# Patient Record
Sex: MALE | Race: WHITE | NOT HISPANIC OR LATINO | Employment: OTHER | URBAN - METROPOLITAN AREA
[De-identification: names, ages, dates, MRNs, and addresses within clinical notes are randomized per-mention and may not be internally consistent; named-entity substitution may affect disease eponyms.]

---

## 2018-12-24 DIAGNOSIS — R06.02 SOB (SHORTNESS OF BREATH): ICD-10-CM

## 2018-12-24 DIAGNOSIS — J43.2 CENTRILOBULAR EMPHYSEMA: Primary | ICD-10-CM

## 2018-12-24 PROBLEM — G47.9 SLEEP DISORDER: Status: ACTIVE | Noted: 2017-02-10

## 2018-12-24 PROBLEM — I10 ESSENTIAL HYPERTENSION: Status: ACTIVE | Noted: 2017-02-10

## 2018-12-26 ENCOUNTER — TELEPHONE (OUTPATIENT)
Dept: PULMONOLOGY | Facility: CLINIC | Age: 68
End: 2018-12-26

## 2018-12-26 RX ORDER — FLUTICASONE PROPIONATE AND SALMETEROL 250; 50 UG/1; UG/1
1 POWDER RESPIRATORY (INHALATION) 2 TIMES DAILY
COMMUNITY
Start: 2017-02-09 | End: 2019-01-09

## 2018-12-26 RX ORDER — TRIAMCINOLONE ACETONIDE 1 MG/G
CREAM TOPICAL
COMMUNITY
Start: 2018-11-21 | End: 2019-11-21

## 2018-12-26 RX ORDER — CLONAZEPAM 1 MG/1
1 TABLET ORAL NIGHTLY
COMMUNITY
Start: 2018-03-13

## 2018-12-26 RX ORDER — TIOTROPIUM BROMIDE 18 UG/1
18 CAPSULE ORAL; RESPIRATORY (INHALATION) DAILY
COMMUNITY
Start: 2017-02-09 | End: 2019-01-09 | Stop reason: ALTCHOICE

## 2018-12-26 RX ORDER — ALBUTEROL SULFATE 0.63 MG/3ML
1 SOLUTION RESPIRATORY (INHALATION) EVERY 6 HOURS PRN
COMMUNITY
End: 2019-01-09

## 2018-12-26 RX ORDER — LISINOPRIL AND HYDROCHLOROTHIAZIDE 10; 12.5 MG/1; MG/1
1 TABLET ORAL DAILY
COMMUNITY
Start: 2018-11-21

## 2018-12-26 RX ORDER — VENLAFAXINE HYDROCHLORIDE 150 MG/1
1 CAPSULE, EXTENDED RELEASE ORAL DAILY
COMMUNITY
Start: 2018-10-03

## 2018-12-26 RX ORDER — NAPROXEN 500 MG/1
1 TABLET ORAL 2 TIMES DAILY PRN
COMMUNITY
Start: 2018-12-18

## 2018-12-26 RX ORDER — DICLOFENAC SODIUM 10 MG/G
GEL TOPICAL 4 TIMES DAILY PRN
COMMUNITY
Start: 2018-04-13

## 2018-12-26 RX ORDER — ASPIRIN 81 MG/1
81 TABLET ORAL DAILY
COMMUNITY
Start: 2017-05-10

## 2018-12-26 RX ORDER — TRAZODONE HYDROCHLORIDE 150 MG/1
150 TABLET ORAL NIGHTLY PRN
COMMUNITY
Start: 2018-11-21

## 2018-12-26 RX ORDER — TEMAZEPAM 30 MG/1
30 CAPSULE ORAL NIGHTLY PRN
COMMUNITY
Start: 2018-11-21

## 2018-12-26 RX ORDER — PREDNISONE 5 MG/1
5 TABLET ORAL DAILY
COMMUNITY
End: 2019-03-03

## 2018-12-26 RX ORDER — HYDROCODONE BITARTRATE AND ACETAMINOPHEN 10; 325 MG/1; MG/1
1 TABLET ORAL EVERY 8 HOURS PRN
COMMUNITY
Start: 2018-11-21

## 2018-12-26 RX ORDER — ALBUTEROL SULFATE 90 UG/1
1-2 AEROSOL, METERED RESPIRATORY (INHALATION) EVERY 4 HOURS PRN
COMMUNITY
End: 2019-01-09 | Stop reason: SDUPTHER

## 2018-12-26 NOTE — TELEPHONE ENCOUNTER
Pt accepted [facility] appointment on 1/09/2018 at 1520 with Dr Vargas.  Pt provided times for all testing appointments.  Pt verbalized understanding.

## 2019-01-09 ENCOUNTER — CLINICAL SUPPORT (OUTPATIENT)
Dept: PULMONOLOGY | Facility: CLINIC | Age: 69
End: 2019-01-09
Payer: MEDICARE

## 2019-01-09 ENCOUNTER — OFFICE VISIT (OUTPATIENT)
Dept: PULMONOLOGY | Facility: CLINIC | Age: 69
End: 2019-01-09
Payer: MEDICARE

## 2019-01-09 ENCOUNTER — HOSPITAL ENCOUNTER (OUTPATIENT)
Dept: RADIOLOGY | Facility: HOSPITAL | Age: 69
Discharge: HOME | End: 2019-01-09
Attending: INTERNAL MEDICINE
Payer: MEDICARE

## 2019-01-09 VITALS
OXYGEN SATURATION: 96 % | RESPIRATION RATE: 18 BRPM | HEART RATE: 70 BPM | DIASTOLIC BLOOD PRESSURE: 60 MMHG | HEIGHT: 69 IN | BODY MASS INDEX: 27.99 KG/M2 | WEIGHT: 189 LBS | SYSTOLIC BLOOD PRESSURE: 124 MMHG

## 2019-01-09 DIAGNOSIS — J44.1 COPD EXACERBATION: ICD-10-CM

## 2019-01-09 DIAGNOSIS — J96.12 CHRONIC RESPIRATORY FAILURE WITH HYPOXIA AND HYPERCAPNIA: Primary | ICD-10-CM

## 2019-01-09 DIAGNOSIS — R06.02 SOB (SHORTNESS OF BREATH): ICD-10-CM

## 2019-01-09 DIAGNOSIS — R09.02 EXERCISE HYPOXEMIA: ICD-10-CM

## 2019-01-09 DIAGNOSIS — J43.2 CENTRILOBULAR EMPHYSEMA: ICD-10-CM

## 2019-01-09 DIAGNOSIS — J44.9 CHRONIC OBSTRUCTIVE PULMONARY DISEASE, UNSPECIFIED COPD TYPE: ICD-10-CM

## 2019-01-09 DIAGNOSIS — J96.11 CHRONIC RESPIRATORY FAILURE WITH HYPOXIA AND HYPERCAPNIA: Primary | ICD-10-CM

## 2019-01-09 DIAGNOSIS — J96.11 CHRONIC RESPIRATORY FAILURE WITH HYPOXIA: ICD-10-CM

## 2019-01-09 DIAGNOSIS — B35.1 NAIL FUNGUS: ICD-10-CM

## 2019-01-09 PROCEDURE — 71046 X-RAY EXAM CHEST 2 VIEWS: CPT | Mod: TC

## 2019-01-09 PROCEDURE — 99214 OFFICE O/P EST MOD 30 MIN: CPT | Mod: PBBFAC,25 | Performed by: INTERNAL MEDICINE

## 2019-01-09 PROCEDURE — 94060 EVALUATION OF WHEEZING: CPT | Mod: PBBFAC

## 2019-01-09 PROCEDURE — 71046 XR CHEST PA AND LATERAL: ICD-10-PCS | Mod: 26,,, | Performed by: RADIOLOGY

## 2019-01-09 PROCEDURE — 99999 PR PBB SHADOW E&M-EST. PATIENT-LVL IV: ICD-10-PCS | Mod: PBBFAC,,, | Performed by: INTERNAL MEDICINE

## 2019-01-09 PROCEDURE — 99999 PR PBB SHADOW E&M-EST. PATIENT-LVL IV: CPT | Mod: PBBFAC,,, | Performed by: INTERNAL MEDICINE

## 2019-01-09 PROCEDURE — 99205 OFFICE O/P NEW HI 60 MIN: CPT | Mod: 25,S$PBB,, | Performed by: INTERNAL MEDICINE

## 2019-01-09 PROCEDURE — 99205 PR OFFICE/OUTPT VISIT, NEW, LEVL V, 60-74 MIN: ICD-10-PCS | Mod: 25,S$PBB,, | Performed by: INTERNAL MEDICINE

## 2019-01-09 PROCEDURE — 94060 PR EVAL OF BRONCHOSPASM: ICD-10-PCS | Mod: 26,S$PBB,, | Performed by: INTERNAL MEDICINE

## 2019-01-09 PROCEDURE — 94060 EVALUATION OF WHEEZING: CPT | Mod: 26,S$PBB,, | Performed by: INTERNAL MEDICINE

## 2019-01-09 PROCEDURE — 71046 X-RAY EXAM CHEST 2 VIEWS: CPT | Mod: 26,,, | Performed by: RADIOLOGY

## 2019-01-09 RX ORDER — AZITHROMYCIN 250 MG/1
TABLET, FILM COATED ORAL
Qty: 6 TABLET | Refills: 1 | Status: SHIPPED | OUTPATIENT
Start: 2019-01-09

## 2019-01-09 RX ORDER — METHYLPREDNISOLONE 4 MG/1
TABLET ORAL
Qty: 1 PACKAGE | Refills: 1 | Status: SHIPPED | OUTPATIENT
Start: 2019-01-09 | End: 2019-03-03

## 2019-01-09 RX ORDER — FLUTICASONE PROPIONATE AND SALMETEROL XINAFOATE 115; 21 UG/1; UG/1
2 AEROSOL, METERED RESPIRATORY (INHALATION) 2 TIMES DAILY
Qty: 1 INHALER | Refills: 11 | Status: SHIPPED | OUTPATIENT
Start: 2019-01-09 | End: 2019-11-15 | Stop reason: ALTCHOICE

## 2019-01-09 RX ORDER — ALBUTEROL SULFATE 0.83 MG/ML
2.5 SOLUTION RESPIRATORY (INHALATION)
Qty: 360 ML | Refills: 11 | Status: SHIPPED | OUTPATIENT
Start: 2019-01-09 | End: 2020-01-09

## 2019-01-09 RX ORDER — VERAPAMIL HYDROCHLORIDE 120 MG/1
TABLET, FILM COATED, EXTENDED RELEASE ORAL
Refills: 1 | COMMUNITY
Start: 2019-01-07

## 2019-01-09 RX ORDER — ALBUTEROL SULFATE 90 UG/1
1-2 AEROSOL, METERED RESPIRATORY (INHALATION) EVERY 4 HOURS PRN
Qty: 1 INHALER | Refills: 11 | Status: SHIPPED | OUTPATIENT
Start: 2019-01-09 | End: 2019-11-15 | Stop reason: SDUPTHER

## 2019-01-09 NOTE — PROGRESS NOTES
Subjective:       Patient ID: Uzair Mosley is a 68 y.o. male.    Chief Complaint: Centrilobular Emphysema    HPI COPD  He presents for evaluation and treatment of COPD. The patient is not currently having symptoms / an exacerbation. Current symptoms include chronic dyspnea, cough productive of clear sputum in small amounts and wheezing. Symptoms have been present since several weeks ago and have been unchanged. He denies chills and fever. Associated symptoms include poor exercise tolerance and shortness of breath.  This episode appears to have been triggered by cold air and exercise. Treatments tried for the current exacerbation: albuterol inhaler and oxygen. The patient has been having similar episodes for approximately 8 years. He uses 1 pillows at night. Patient currently is on oxygen at 2 L/min per nasal cannula.. The patient is having no constitutional symptoms, denying fever, chills, anorexia, or weight loss. The patient has been hospitalized for this condition before. He quit smoking approximately many year ago. The patient is experiencing exercise intolerance (difficulty walking 1 blocks on flat ground).      Failed use of adviar disk - dry powder is irritating  prescription for adviar MDI    Chronic hypoxic respiratory  Failure:  The patient has been prescribed oxygen for chronic respiratory condition . He has severe lung disease and hypoxia related symptoms. Other treatments including inhaled bronchodilators, nebulizer treatments have been tried. Patient is mobile within the home and would benefit from prescribed oxygen.Patient has documented nocturnal hypoxemia and signs and symptoms of nocturnal hypoxia which include but are not limited to fatigue, daytime hypersomnolence , non restorative sleep, edema.    Is not happy with [facility] - want to have a new supplier of [employer]QUIPMENT  New prescription for oxygen written    Patient has chronic respiratory failure due to  Chronic Obstructive Pulmonary  Disease. Patient will need a targeted tidal volume which cannot be provided via Continuous Positive Airway Pressure. BiPAP will not lower the patient's CO2 levels . This patient will require a set tidal volume. Non-Invasive ventilation is being ordered due the the severe nature of this patient 's disease. Without this therapy the patient is at risk for terminal decline due to progressive respiratory failure.      Results for DIEGO TREADWELL (MRN [id number]) as of 3/29/2019 16:55   Ref. Range 3/3/2019 03:33   POC PH Latest Ref Range: 7.35 - 7.45  7.322 (L)   POC PCO2 Latest Ref Range: 35 - 45 mmHg 58.0 (HH)   POC PO2 Latest Ref Range: 80 - 100 mmHg 65 (L)   POC BE Latest Ref Range: -2 to 2 mmol/L 4   POC HCO3 Latest Ref Range: 24 - 28 mmol/L 30.0 (H)   POC SATURATED O2 Latest Ref Range: 95 - 100 % 90 (L)   Flow Unknown 3   Sample Unknown ARTERIAL   DelSys Unknown Nasal Can   Allens Test Unknown Pass   Site Unknown LR   Mode Unknown SPONT           Past Medical History:   Diagnosis Date    Adjustment disorder with depressed mood 05/25/2018    COPD (chronic obstructive pulmonary disease) 10/08/2012    Disorientation 11/26/2012    Essential hypertension 02/10/2017    Hypoxemia requiring supplemental oxygen 11/26/2012    Obstructive chronic bronchitis with exacerbation 05/21/2013    Pleural effusion 10/08/2012    Sedative dependence 02/10/2017    Sleep disorder 02/10/2017    SOB (shortness of breath) 10/30/2012    Uncomplicated opioid dependence 02/10/2017     History reviewed. No pertinent surgical history.  Social History     Socioeconomic History    Marital status:      Spouse name: Not on file    Number of children: Not on file    Years of education: Not on file    Highest education level: Not on file   Occupational History    Not on file   Social Needs    Financial resource strain: Not on file    Food insecurity:     Worry: Not on file     Inability: Not on file    Transportation needs:      Medical: Not on file     Non-medical: Not on file   Tobacco Use    Smoking status: Former Smoker     Packs/day: 2.00     Years: 24.00     Pack years: 48.00     Start date: 1966     Last attempt to quit: 1992     Years since quittin.2    Smokeless tobacco: Never Used   Substance and Sexual Activity    Alcohol use: No     Frequency: Never    Drug use: Not on file    Sexual activity: Not on file   Lifestyle    Physical activity:     Days per week: Not on file     Minutes per session: Not on file    Stress: Not on file   Relationships    Social connections:     Talks on phone: Not on file     Gets together: Not on file     Attends Anabaptism service: Not on file     Active member of club or organization: Not on file     Attends meetings of clubs or organizations: Not on file     Relationship status: Not on file    Intimate partner violence:     Fear of current or ex partner: Not on file     Emotionally abused: Not on file     Physically abused: Not on file     Forced sexual activity: Not on file   Other Topics Concern    Not on file   Social History Narrative    Not on file     Review of Systems   Constitutional: Positive for fatigue. Negative for fever.   HENT: Positive for postnasal drip, rhinorrhea and congestion.    Eyes: Negative for redness and itching.   Respiratory: Positive for cough, sputum production, shortness of breath, dyspnea on extertion, use of rescue inhaler and Paroxysmal Nocturnal Dyspnea.    Cardiovascular: Negative for chest pain, palpitations and leg swelling.   Genitourinary: Negative for difficulty urinating and hematuria.   Endocrine: Negative for cold intolerance and heat intolerance.    Skin: Negative for rash.   Gastrointestinal: Negative for nausea and abdominal pain.   Neurological: Negative for dizziness, syncope, weakness and light-headedness.   Hematological: Negative for adenopathy. Does not bruise/bleed easily.   Psychiatric/Behavioral: Negative for sleep  disturbance. The patient is not nervous/anxious.        Objective:      Physical Exam   Constitutional: He is oriented to person, place, and time. He appears well-developed and well-nourished.   HENT:   Head: Normocephalic and atraumatic.   Mouth/Throat: Oropharyngeal exudate present.   Eyes: Conjunctivae are normal. Pupils are equal, round, and reactive to light.   Neck: Neck supple. No JVD present. No tracheal deviation present. No thyromegaly present.   Cardiovascular: Normal rate, regular rhythm and normal heart sounds.   No murmur heard.  Pulmonary/Chest: Effort normal. He has decreased breath sounds. He has wheezes in the right lower field and the left lower field. He has no rhonchi. He has no rales.   Abdominal: Soft. Bowel sounds are normal.   Musculoskeletal: Normal range of motion. He exhibits no edema or tenderness.   Lymphadenopathy:     He has no cervical adenopathy.   Neurological: He is alert and oriented to person, place, and time.   Skin: Skin is warm and dry.   Nursing note and vitals reviewed.    Personal Diagnostic Review  Chest X-Ray: I personally reviewed the films and findings are:, air trapping/emphysema  Pulmonary function tests:  Severe obstruction  Conditions of testing: Stable outpatient  Results:  88% with exercise  92 % on room air  Hypoxemia with exercise.  Corrected with supplemental oxygen.  Diagnosis: Chronic respiratory failure      Moy Vargas MD  Pulmonary / Critical Care Medicine    Pulmonary Studies Review 3/3/2019 3/3/2019 3/3/2019 3/3/2019 3/3/2019 3/3/2019 1/9/2019   SpO2 95 93 93 90 96 92 96   Height - - - - - 69 69.000   Weight - - - - - 3024 3024   BMI (Calculated) - - - - - 28 28   Predicted Distance - - - - - 344.12 344.12   Predicted Distance Meters (Calculated) - - - - - 515.74 515.74     No flowsheet data found.      Assessment:       1. Chronic respiratory failure with hypoxia and hypercapnia    2. Chronic obstructive pulmonary disease, unspecified COPD type     3. Chronic respiratory failure with hypoxia    4. COPD exacerbation    5. Exercise hypoxemia    6. Nail fungus                        Outpatient Encounter Medications as of 2019   Medication Sig Dispense Refill    aspirin (ECOTRIN) 81 MG EC tablet Take 81 mg by mouth Daily.      diclofenac sodium (VOLTAREN) 1 % Gel Apply topically 4 (four) times daily as needed.      naproxen (NAPROSYN) 500 MG tablet Take 1 tablet by mouth 2 (two) times daily as needed.      temazepam (RESTORIL) 30 mg capsule Take 30 mg by mouth nightly as needed.      traZODone (DESYREL) 150 MG tablet Take 150 mg by mouth nightly as needed.      triamcinolone acetonide 0.1% (KENALOG) 0.1 % cream Apply to affected area 2 times daily      venlafaxine (EFFEXOR-XR) 150 MG Cp24 Take 1 capsule by mouth Daily.      [DISCONTINUED] predniSONE (DELTASONE) 5 MG tablet Take 5 mg by mouth Daily.      [DISCONTINUED] tiotropium (SPIRIVA) 18 mcg inhalation capsule Inhale 18 mcg into the lungs Daily.      albuterol (PROVENTIL) 2.5 mg /3 mL (0.083 %) nebulizer solution Take 3 mLs (2.5 mg total) by nebulization every 6 (six) hours while awake. 360 mL 11    albuterol (PROVENTIL/VENTOLIN HFA) 90 mcg/actuation inhaler Inhale 1-2 puffs into the lungs every 4 (four) hours as needed. 1 Inhaler 11    azithromycin (ZITHROMAX Z-AMALIA) 250 MG tablet 500 mg on day 1 (two tablets) followed by 250 mg once daily on days 2-5 6 tablet 1    clonazePAM (KLONOPIN) 1 MG tablet Take 1 mg by mouth every evening.      fluticasone-salmeterol (ADVAIR HFA) 115-21 mcg/actuation HFAA Inhale 2 puffs into the lungs 2 (two) times daily. Controller 1 Inhaler 11    HYDROcodone-acetaminophen (NORCO)  mg per tablet Take 1 tablet by mouth every 8 (eight) hours as needed.      lisinopril-hydrochlorothiazide (PRINZIDE,ZESTORETIC) 10-12.5 mg per tablet Take 1 tablet by mouth Daily.      [] umeclidinium (INCRUSE ELLIPTA) 62.5 mcg/actuation DsDv Inhale 62.5 mcg into the  "lungs once daily. 30 each 11    verapamil (CALAN-SR) 120 MG CR tablet TK 1 T PO Q NIGHT  1    [DISCONTINUED] albuterol (ACCUNEB) 0.63 mg/3 mL Nebu Inhale 1 ampule into the lungs every 6 (six) hours as needed.      [DISCONTINUED] albuterol (PROVENTIL/VENTOLIN HFA) 90 mcg/actuation inhaler Inhale 1-2 puffs into the lungs every 4 (four) hours as needed.      [DISCONTINUED] fluticasone-salmeterol 250-50 mcg/dose (ADVAIR) 250-50 mcg/dose diskus inhaler Inhale 1 puff into the lungs 2 (two) times daily.      [DISCONTINUED] methylPREDNISolone (MEDROL DOSEPACK) 4 mg tablet use as directed 1 Package 1     No facility-administered encounter medications on file as of 1/9/2019.      Orders Placed This Encounter   Procedures    HME - OTHER     Referred to [facility] company:  [id number]  [place] [id number]  [phone]  Fax [phone]     Order Specific Question:   Type of Equipment:     Answer:   discontinue oxygen     Order Specific Question:   Height:     Answer:   5' 9" (1.753 m)     Order Specific Question:   Weight:     Answer:   85.7 kg (189 lb)     Order Specific Question:   Does patient have medical equipment at home?     Answer:   portable oxygen    OXYGEN FOR HOME USE     Patient preference: battery operated portable unit - SANDOR -with oxygen conserving device  [facility] for CPAP/Oxygen/Nebulizer supplies.  Customer Service: [phone]  Call: [phone]  Fax: [phone]  Billing Inquiries: [phone] or [phone]     Order Specific Question:   Liter Flow     Answer:   2     Order Specific Question:   Duration     Answer:   With activity     Order Specific Question:   Qualifying SpO2:     Answer:   88%     Order Specific Question:   Testing done at:     Answer:   Exercise/Activity     Order Specific Question:   Route     Answer:   nasal cannula     Order Specific Question:   Portable mode:     Answer:   pulse dose acceptable     Order Specific Question:   Device     Answer:  " " home concentrator with portable unit     Order Specific Question:   Length of need (in months):     Answer:   99 mos     Order Specific Question:   Patient condition with qualifying saturation     Answer:   COPD     Order Specific Question:   Height:     Answer:   5' 9" (1.753 m)     Order Specific Question:   Weight:     Answer:   85.7 kg (189 lb)     Order Specific Question:   Alternative treatment measures have been tried or considered and deemed clinically ineffective.     Answer:   Yes    VENTILATOR FOR HOME USE     Download ventilation reports with [employer] software download frequency - monthly  Hours of use 8  Continuous  during sleep  Pmax has to be the sum of EPAP min and PS max  Pmax = EPAP min + PS Max  Referred to DME company:  [employer].  [place] [id number]  [phone]  Fax [phone]     Order Specific Question:   Height:     Answer:   5' 9" (1.753 m)     Order Specific Question:   Weight:     Answer:   85.7 kg (189 lb)     Order Specific Question:   Length of need (1-99 months):     Answer:   99     Order Specific Question:   Interface needed:     Answer:   Nasal mask     Order Specific Question:   Mode:     Answer:   AVAPS-AE     Order Specific Question:   Rate:     Answer:   12     Comments:   auto     Order Specific Question:   Tidal Volume     Answer:   400     Comments:   8 mg/kg ideal body weight     Order Specific Question:   PEEP - EPAP     Answer:   4.0 CM/H2O     Order Specific Question:   Pressure support - IPAP     Answer:   14     Comments:   EPAP max 14, EPAP min 4     Order Specific Question:   FiO2%     Answer:   21     Order Specific Question:   Humidifier needed?     Answer:   Yes    Ambulatory Referral to Dermatology     Referral Priority:   Routine     Referral Type:   Consultation     Referral Reason:   Specialty Services Required     Requested Specialty:   Dermatology     Number of Visits Requested:   1    Stress test, pulmonary     Standing " Status:   Future     Standing Expiration Date:   2020     Plan:       Requested Prescriptions     Signed Prescriptions Disp Refills    albuterol (PROVENTIL) 2.5 mg /3 mL (0.083 %) nebulizer solution 360 mL 11     Sig: Take 3 mLs (2.5 mg total) by nebulization every 6 (six) hours while awake.    albuterol (PROVENTIL/VENTOLIN HFA) 90 mcg/actuation inhaler 1 Inhaler 11     Sig: Inhale 1-2 puffs into the lungs every 4 (four) hours as needed.    umeclidinium (INCRUSE ELLIPTA) 62.5 mcg/actuation DsDv 30 each 11     Sig: Inhale 62.5 mcg into the lungs once daily.    fluticasone-salmeterol (ADVAIR HFA) 115-21 mcg/actuation HFAA 1 Inhaler 11     Sig: Inhale 2 puffs into the lungs 2 (two) times daily. Controller    azithromycin (ZITHROMAX Z-AMALIA) 250 MG tablet 6 tablet 1     Si mg on day 1 (two tablets) followed by 250 mg once daily on days 2-5     Chronic respiratory failure with hypoxia and hypercapnia  -     VENTILATOR FOR HOME USE    Chronic obstructive pulmonary disease, unspecified COPD type  -     albuterol (PROVENTIL) 2.5 mg /3 mL (0.083 %) nebulizer solution; Take 3 mLs (2.5 mg total) by nebulization every 6 (six) hours while awake.  Dispense: 360 mL; Refill: 11  -     HME - OTHER  -     albuterol (PROVENTIL/VENTOLIN HFA) 90 mcg/actuation inhaler; Inhale 1-2 puffs into the lungs every 4 (four) hours as needed.  Dispense: 1 Inhaler; Refill: 11  -     umeclidinium (INCRUSE ELLIPTA) 62.5 mcg/actuation DsDv; Inhale 62.5 mcg into the lungs once daily.  Dispense: 30 each; Refill: 11  -     fluticasone-salmeterol (ADVAIR HFA) 115-21 mcg/actuation HFAA; Inhale 2 puffs into the lungs 2 (two) times daily. Controller  Dispense: 1 Inhaler; Refill: 11  -     OXYGEN FOR HOME USE  -     Stress test, pulmonary; Future    Chronic respiratory failure with hypoxia  -     HME - OTHER  -     OXYGEN FOR HOME USE    COPD exacerbation  -     Discontinue: methylPREDNISolone (MEDROL DOSEPACK) 4 mg tablet; use as directed   Dispense: 1 Package; Refill: 1  -     azithromycin (ZITHROMAX Z-AMALIA) 250 MG tablet; 500 mg on day 1 (two tablets) followed by 250 mg once daily on days 2-5  Dispense: 6 tablet; Refill: 1    Exercise hypoxemia  -     OXYGEN FOR HOME USE  -     Stress test, pulmonary; Future    Nail fungus  -     Ambulatory Referral to Dermatology      Follow-up in about 8 weeks (around 3/4/2019) for 6 min walk.    MEDICAL DECISION MAKING: Moderate to high complexity.  Overall, the multiple problems listed are of moderate to high severity that may impact quality of life and activities of daily living. Side effects of medications, treatment plan as well as options and alternatives reviewed and discussed with patient. There was counseling of patient concerning these issues.    Total time spent in face to face counseling and coordination of care - 60 minutes over 50% of time was used in discussion of prognosis, risks, benefits of treatment, instructions and compliance with regimen . Discussion with other physicians or health care providers occurred.

## 2019-01-10 LAB
BRPFT: ABNORMAL
FEF 25 75 CHG: 70.5 %
FEF 25 75 LLN: 1.02
FEF 25 75 POST REF: 17.8 %
FEF 25 75 PRE REF: 10.5 %
FEF 25 75 REF: 2.37
FET100 CHG: -0.8 %
FEV1 CHG: 42.2 %
FEV1 FVC CHG: -3.7 %
FEV1 FVC LLN: 63
FEV1 FVC POST REF: 48.4 %
FEV1 FVC PRE REF: 50.2 %
FEV1 FVC REF: 76
FEV1 LLN: 2.24
FEV1 POST REF: 31.8 %
FEV1 PRE REF: 22.3 %
FEV1 REF: 3.1
FEV6 CHG: 52.1 %
FEV6 LLN: 3.13
FEV6 POST REF: 59.8 %
FEV6 POST: 2.4 L (ref 3.13–4.89)
FEV6 PRE REF: 39.3 %
FEV6 PRE: 1.58 L (ref 3.13–4.89)
FEV6 REF: 4.01
FVC CHG: 47.6 %
FVC LLN: 3.03
FVC POST REF: 65.4 %
FVC PRE REF: 44.3 %
FVC REF: 4.09
MVV LLN: 103
MVV REF: 121
PEF CHG: 12.9 %
PEF LLN: 5.88
PEF POST REF: 39.8 %
PEF PRE REF: 35.3 %
PEF REF: 8.14
POST FEF 25 75: 0.42 L/S (ref 1.02–4.27)
POST FET 100: 8.86 SEC
POST FEV1 FVC: 36.82 % (ref 62.61–88.03)
POST FEV1: 0.98 L (ref 2.24–3.91)
POST FVC: 2.67 L (ref 3.03–5.16)
POST PEF: 3.24 L/S (ref 5.88–10.39)
PRE FEF 25 75: 0.25 L/S (ref 1.02–4.27)
PRE FET 100: 8.93 SEC
PRE FEV1 FVC: 38.23 % (ref 62.61–88.03)
PRE FEV1: 0.69 L (ref 2.24–3.91)
PRE FVC: 1.81 L (ref 3.03–5.16)
PRE PEF: 2.87 L/S (ref 5.88–10.39)

## 2019-01-23 ENCOUNTER — TELEPHONE (OUTPATIENT)
Dept: PULMONOLOGY | Facility: CLINIC | Age: 69
End: 2019-01-23

## 2019-01-23 NOTE — TELEPHONE ENCOUNTER
----- Message from Nadiya Robertson sent at 1/23/2019  9:37 AM CST -----  Contact: Marlon Roy- [phone]  Would like to consult with the nurse about Oxygen orders and home delivery.  Please call back at [phone].  x-

## 2019-01-23 NOTE — TELEPHONE ENCOUNTER
Pt's wife instructed to contact [facility] for assistance.     [facility] for CPAP/Oxygen/Nebulizer supplies.  Customer Service: [phone]  Call: [phone]  Fax: [phone]  Billing Inquiries: [phone] or [phone]

## 2019-02-22 ENCOUNTER — TELEPHONE (OUTPATIENT)
Dept: PULMONOLOGY | Facility: CLINIC | Age: 69
End: 2019-02-22

## 2019-03-03 ENCOUNTER — HOSPITAL ENCOUNTER (EMERGENCY)
Facility: HOSPITAL | Age: 69
Discharge: HOME | End: 2019-03-03
Attending: EMERGENCY MEDICINE
Payer: MEDICARE

## 2019-03-03 VITALS
BODY MASS INDEX: 27.99 KG/M2 | RESPIRATION RATE: 20 BRPM | TEMPERATURE: 98 F | DIASTOLIC BLOOD PRESSURE: 66 MMHG | HEIGHT: 69 IN | WEIGHT: 189 LBS | SYSTOLIC BLOOD PRESSURE: 109 MMHG | OXYGEN SATURATION: 95 % | HEART RATE: 98 BPM

## 2019-03-03 DIAGNOSIS — J20.9 ACUTE BRONCHITIS, UNSPECIFIED ORGANISM: ICD-10-CM

## 2019-03-03 DIAGNOSIS — J44.1 COPD EXACERBATION: Primary | ICD-10-CM

## 2019-03-03 DIAGNOSIS — R06.02 SOB (SHORTNESS OF BREATH): ICD-10-CM

## 2019-03-03 LAB
ALLENS TEST: ABNORMAL
ANION GAP SERPL CALC-SCNC: 12 MMOL/L
BASOPHILS # BLD AUTO: 0.04 K/UL
BASOPHILS NFR BLD: 0.5 %
BNP SERPL-MCNC: <10 PG/ML
BUN SERPL-MCNC: 17 MG/DL
CALCIUM SERPL-MCNC: 10.7 MG/DL
CHLORIDE SERPL-SCNC: 99 MMOL/L
CO2 SERPL-SCNC: 29 MMOL/L
CREAT SERPL-MCNC: 1.2 MG/DL
DELSYS: ABNORMAL
DIFFERENTIAL METHOD: NORMAL
EOSINOPHIL # BLD AUTO: 0.5 K/UL
EOSINOPHIL NFR BLD: 6.3 %
ERYTHROCYTE [DISTWIDTH] IN BLOOD BY AUTOMATED COUNT: 12.9 %
EST. GFR  (AFRICAN AMERICAN): >60 ML/MIN/1.73 M^2
EST. GFR  (NON AFRICAN AMERICAN): >60 ML/MIN/1.73 M^2
FLOW: 3
GLUCOSE SERPL-MCNC: 138 MG/DL
HCO3 UR-SCNC: 30 MMOL/L (ref 24–28)
HCT VFR BLD AUTO: 44.5 %
HGB BLD-MCNC: 14.4 G/DL
LYMPHOCYTES # BLD AUTO: 2.6 K/UL
LYMPHOCYTES NFR BLD: 31.2 %
MCH RBC QN AUTO: 30.5 PG
MCHC RBC AUTO-ENTMCNC: 32.4 G/DL
MCV RBC AUTO: 94 FL
MODE: ABNORMAL
MONOCYTES # BLD AUTO: 0.6 K/UL
MONOCYTES NFR BLD: 7.5 %
NEUTROPHILS # BLD AUTO: 4.5 K/UL
NEUTROPHILS NFR BLD: 54.5 %
PCO2 BLDA: 58 MMHG (ref 35–45)
PH SMN: 7.32 [PH] (ref 7.35–7.45)
PLATELET # BLD AUTO: 234 K/UL
PMV BLD AUTO: 9.5 FL
PO2 BLDA: 65 MMHG (ref 80–100)
POC BE: 4 MMOL/L
POC SATURATED O2: 90 % (ref 95–100)
POTASSIUM SERPL-SCNC: 4 MMOL/L
RBC # BLD AUTO: 4.72 M/UL
SAMPLE: ABNORMAL
SITE: ABNORMAL
SODIUM SERPL-SCNC: 140 MMOL/L
WBC # BLD AUTO: 8.28 K/UL

## 2019-03-03 PROCEDURE — 82375 ASSAY CARBOXYHB QUANT: CPT

## 2019-03-03 PROCEDURE — 63600175 PHARM REV CODE 636 W HCPCS: Performed by: EMERGENCY MEDICINE

## 2019-03-03 PROCEDURE — 93010 ELECTROCARDIOGRAM REPORT: CPT | Mod: ,,, | Performed by: INTERNAL MEDICINE

## 2019-03-03 PROCEDURE — 25000242 PHARM REV CODE 250 ALT 637 W/ HCPCS: Performed by: EMERGENCY MEDICINE

## 2019-03-03 PROCEDURE — 96365 THER/PROPH/DIAG IV INF INIT: CPT

## 2019-03-03 PROCEDURE — 82803 BLOOD GASES ANY COMBINATION: CPT

## 2019-03-03 PROCEDURE — 85025 COMPLETE CBC W/AUTO DIFF WBC: CPT

## 2019-03-03 PROCEDURE — 37799 UNLISTED PX VASCULAR SURGERY: CPT

## 2019-03-03 PROCEDURE — 25000003 PHARM REV CODE 250: Performed by: EMERGENCY MEDICINE

## 2019-03-03 PROCEDURE — 83880 ASSAY OF NATRIURETIC PEPTIDE: CPT

## 2019-03-03 PROCEDURE — 27000221 HC OXYGEN, UP TO 24 HOURS

## 2019-03-03 PROCEDURE — 99900035 HC TECH TIME PER 15 MIN (STAT)

## 2019-03-03 PROCEDURE — 82800 BLOOD PH: CPT

## 2019-03-03 PROCEDURE — 80048 BASIC METABOLIC PNL TOTAL CA: CPT

## 2019-03-03 PROCEDURE — 96375 TX/PRO/DX INJ NEW DRUG ADDON: CPT

## 2019-03-03 PROCEDURE — 36600 WITHDRAWAL OF ARTERIAL BLOOD: CPT

## 2019-03-03 PROCEDURE — 99284 EMERGENCY DEPT VISIT MOD MDM: CPT | Mod: 25

## 2019-03-03 PROCEDURE — 93010 EKG 12-LEAD: ICD-10-PCS | Mod: ,,, | Performed by: INTERNAL MEDICINE

## 2019-03-03 PROCEDURE — 94640 AIRWAY INHALATION TREATMENT: CPT

## 2019-03-03 PROCEDURE — 93005 ELECTROCARDIOGRAM TRACING: CPT

## 2019-03-03 PROCEDURE — 36415 COLL VENOUS BLD VENIPUNCTURE: CPT

## 2019-03-03 RX ORDER — PREDNISONE 50 MG/1
50 TABLET ORAL DAILY
Qty: 7 TABLET | Refills: 0 | Status: SHIPPED | OUTPATIENT
Start: 2019-03-03 | End: 2019-03-10

## 2019-03-03 RX ORDER — IPRATROPIUM BROMIDE AND ALBUTEROL SULFATE 2.5; .5 MG/3ML; MG/3ML
3 SOLUTION RESPIRATORY (INHALATION) ONCE
Status: COMPLETED | OUTPATIENT
Start: 2019-03-03 | End: 2019-03-03

## 2019-03-03 RX ORDER — AZITHROMYCIN 250 MG/1
250 TABLET, FILM COATED ORAL DAILY
Qty: 6 TABLET | Refills: 0 | Status: SHIPPED | OUTPATIENT
Start: 2019-03-03

## 2019-03-03 RX ORDER — ONDANSETRON 2 MG/ML
4 INJECTION INTRAMUSCULAR; INTRAVENOUS
Status: COMPLETED | OUTPATIENT
Start: 2019-03-03 | End: 2019-03-03

## 2019-03-03 RX ADMIN — ONDANSETRON 4 MG: 2 INJECTION INTRAMUSCULAR; INTRAVENOUS at 03:03

## 2019-03-03 RX ADMIN — IPRATROPIUM BROMIDE AND ALBUTEROL SULFATE 3 ML: .5; 3 SOLUTION RESPIRATORY (INHALATION) at 02:03

## 2019-03-03 RX ADMIN — AZITHROMYCIN MONOHYDRATE 500 MG: 500 INJECTION, POWDER, LYOPHILIZED, FOR SOLUTION INTRAVENOUS at 02:03

## 2019-03-03 RX ADMIN — SODIUM CHLORIDE 500 ML: 0.9 INJECTION, SOLUTION INTRAVENOUS at 02:03

## 2019-03-03 NOTE — ED NOTES
Pt reports LOC tonight due to SOB and dizziness. Pt unsure if he hit his head. MD Anderson notified.

## 2019-03-03 NOTE — ED PROVIDER NOTES
"SCRIBE #1 NOTE: I, Laney Hal, am scribing for, and in the presence of, Roland Anderson MD. I have scribed the entire note.      History      Chief Complaint   Patient presents with    Shortness of Breath     hx COPD. "Woke up" SOB.       Review of patient's allergies indicates:   Allergen Reactions    Ciprofloxacin Other (See Comments)     Mental status changes        HPI   HPI    3/3/2019, 2:17 AM   History obtained from the patient      History of Present Illness: Uzair Mosley is a 68 y.o. male patient with a PMHx of COPD, HTN, pleural effusion, who presents to the Emergency Department for SOB which onset suddenly PTA waking pt up from sleep. Symptoms are constant and moderate in severity. No mitigating or exacerbating factors reported. Associated sxs include productive cough with green sputum x1 week. Patient denies any fever, chills, diaphoresis, CP, BLE edema/pain, extremity weakness/numbness, dizziness, n/v, recent travel/long car rides, and all other sxs at this time. Prior Tx includes 1 duoneb, 1 solumedrol breathing Tx, and 125 Solumedrol. Pt's is exposed to second hand smoke. No further complaints or concerns at this time.       Arrival mode: EMS    PCP: To Obtain Unable       Past Medical History:  Past Medical History:   Diagnosis Date    Adjustment disorder with depressed mood 05/25/2018    COPD (chronic obstructive pulmonary disease) 10/08/2012    Disorientation 11/26/2012    Essential hypertension 02/10/2017    Hypoxemia requiring supplemental oxygen 11/26/2012    Obstructive chronic bronchitis with exacerbation 05/21/2013    Pleural effusion 10/08/2012    Sedative dependence 02/10/2017    Sleep disorder 02/10/2017    SOB (shortness of breath) 10/30/2012    Uncomplicated opioid dependence 02/10/2017     Past Surgical History:  Past surgical history reviewed not relevant      Family History:  Family history reviewed not relevant    Social History:  Social History     Tobacco Use    " Smoking status: Former Smoker     Packs/day: 2.00     Years: 24.00     Pack years: 48.00     Start date: 1966     Last attempt to quit: 1992     Years since quittin.1    Smokeless tobacco: Never Used   Substance and Sexual Activity    Alcohol use: No     Frequency: Never    Drug use: unknown    Sexual activity: unknown       ROS   Review of Systems   Constitutional: Negative for chills, diaphoresis and fever.   HENT: Negative for congestion and sore throat.    Eyes: Negative for visual disturbance.   Respiratory: Positive for cough and shortness of breath.    Cardiovascular: Negative for chest pain, palpitations and leg swelling.   Gastrointestinal: Negative for nausea and vomiting.   Genitourinary: Negative for dysuria.   Musculoskeletal: Negative for back pain and myalgias.   Skin: Negative for rash.   Neurological: Negative for dizziness, weakness and numbness.   Hematological: Does not bruise/bleed easily.   All other systems reviewed and are negative.    Physical Exam      Initial Vitals [19 0215]   BP Pulse Resp Temp SpO2   134/74 105 (!) 31 98.4 °F (36.9 °C) (!) 92 %      MAP       --          Physical Exam  Nursing Notes and Vital Signs Reviewed.  Constitutional: Patient is in mild distress. Well-developed and well-nourished.  Head: Atraumatic. Normocephalic.  Eyes: PERRL. EOM intact. Conjunctivae are not pale. No scleral icterus.  ENT: Mucous membranes are moist. Oropharynx is clear and symmetric.    Neck: Supple. Full ROM. No lymphadenopathy.  Cardiovascular: Tachycardic. Regular rhythm. No murmurs, rubs, or gallops. Distal pulses are 2+ and symmetric.  Pulmonary/Chest: No respiratory distress. Bilateral end expiratory wheezes. Bilateral coarse lung sounds.   Abdominal: Soft and non-distended.  There is no tenderness.  No rebound, guarding, or rigidity.   Musculoskeletal: Moves all extremities. No obvious deformities. No edema. No calf tenderness.  Skin: Warm. Diaphoretic.  "  Neurological:  Alert, awake, and appropriate. Large amplitude tremor diffusely. Normal speech.  No acute focal neurological deficits are appreciated.  Psychiatric: Normal affect. Good eye contact. Appropriate in content.    ED Course    Critical Care  Date/Time: 3/3/2019 3:58 AM  Performed by: Roland Anderson MD  Authorized by: Roland Anderson MD   Direct patient critical care time: 25 minutes  Additional history critical care time: 5 minutes  Ordering / reviewing critical care time: 5 minutes  Documentation critical care time: 5 minutes  Total critical care time (exclusive of procedural time) : 40 minutes  Critical care time was exclusive of separately billable procedures and treating other patients and teaching time.  Critical care was necessary to treat or prevent imminent or life-threatening deterioration of the following conditions: respiratory failure.  Critical care was time spent personally by me on the following activities: blood draw for specimens, evaluation of patient's response to treatment, obtaining history from patient or surrogate, ordering and review of laboratory studies, pulse oximetry, review of old charts, development of treatment plan with patient or surrogate, discussions with primary provider, interpretation of cardiac output measurements, examination of patient, ordering and performing treatments and interventions, ordering and review of radiographic studies and re-evaluation of patient's condition.        ED Vital Signs:  Vitals:    03/03/19 0215 03/03/19 0222 03/03/19 0225 03/03/19 0253   BP: 134/74      Pulse: 105 95 102 94   Resp: (!) 31  18 18   Temp: 98.4 °F (36.9 °C)      TempSrc: Oral      SpO2: (!) 92%  96% (!) 90%   Weight: 85.7 kg (189 lb)      Height: 5' 9" (1.753 m)       03/03/19 0300 03/03/19 0332 03/03/19 0333 03/03/19 0402   BP:   (!) 101/55 109/66   Pulse: 95 93  98   Resp: 18 12  20   Temp:    97.9 °F (36.6 °C)   TempSrc:    Oral   SpO2: (!) 93% (!) 93%  95% "   Weight:       Height:           Abnormal Lab Results:  Labs Reviewed   BASIC METABOLIC PANEL - Abnormal; Notable for the following components:       Result Value    Glucose 138 (*)     Calcium 10.7 (*)     All other components within normal limits   ISTAT PROCEDURE - Abnormal; Notable for the following components:    POC PH 7.322 (*)     POC PCO2 58.0 (*)     POC PO2 65 (*)     POC HCO3 30.0 (*)     POC SATURATED O2 90 (*)     All other components within normal limits   CBC W/ AUTO DIFFERENTIAL   B-TYPE NATRIURETIC PEPTIDE        All Lab Results:  Results for orders placed or performed during the hospital encounter of 03/03/19   CBC auto differential   Result Value Ref Range    WBC 8.28 3.90 - 12.70 K/uL    RBC 4.72 4.60 - 6.20 M/uL    Hemoglobin 14.4 14.0 - 18.0 g/dL    Hematocrit 44.5 40.0 - 54.0 %    MCV 94 82 - 98 fL    MCH 30.5 27.0 - 31.0 pg    MCHC 32.4 32.0 - 36.0 g/dL    RDW 12.9 11.5 - 14.5 %    Platelets 234 150 - 350 K/uL    MPV 9.5 9.2 - 12.9 fL    Gran # (ANC) 4.5 1.8 - 7.7 K/uL    Lymph # 2.6 1.0 - 4.8 K/uL    Mono # 0.6 0.3 - 1.0 K/uL    Eos # 0.5 0.0 - 0.5 K/uL    Baso # 0.04 0.00 - 0.20 K/uL    Gran% 54.5 38.0 - 73.0 %    Lymph% 31.2 18.0 - 48.0 %    Mono% 7.5 4.0 - 15.0 %    Eosinophil% 6.3 0.0 - 8.0 %    Basophil% 0.5 0.0 - 1.9 %    Differential Method Automated    Basic metabolic panel   Result Value Ref Range    Sodium 140 136 - 145 mmol/L    Potassium 4.0 3.5 - 5.1 mmol/L    Chloride 99 95 - 110 mmol/L    CO2 29 23 - 29 mmol/L    Glucose 138 (H) 70 - 110 mg/dL    BUN, Bld 17 8 - 23 mg/dL    Creatinine 1.2 0.5 - 1.4 mg/dL    Calcium 10.7 (H) 8.7 - 10.5 mg/dL    Anion Gap 12 8 - 16 mmol/L    eGFR if African American >60 >60 mL/min/1.73 m^2    eGFR if non African American >60 >60 mL/min/1.73 m^2   Brain natriuretic peptide   Result Value Ref Range    BNP <10 0 - 99 pg/mL   ISTAT PROCEDURE   Result Value Ref Range    POC PH 7.322 (L) 7.35 - 7.45    POC PCO2 58.0 (HH) 35 - 45 mmHg    POC PO2 65  (L) 80 - 100 mmHg    POC HCO3 30.0 (H) 24 - 28 mmol/L    POC BE 4 -2 to 2 mmol/L    POC SATURATED O2 90 (L) 95 - 100 %    Sample ARTERIAL     Site LR     Allens Test Pass     DelSys Nasal Can     Mode SPONT     Flow 3          Imaging Results:  Imaging Results          X-Ray Chest AP Portable (In process)                3:59 AM: Per ED physician, pt's CXR results show: hyperexpansion. No infiltrate.        The EKG was ordered, reviewed, and independently interpreted by the ED provider.  Interpretation time: 0222  Rate: 95 BPM  Rhythm: normal sinus rhythm  Interpretation: RBBB. No STEMI.        The Emergency Provider reviewed the vital signs and test results, which are outlined above.    ED Discussion     4:03 AM: Re-evaluated pt. Pt is requesting discharge. Pt does not want any further Tx at this time. He reports he feels much better. Pt still has end expiratory wheezes but there has been improvement. Discussed with pt all pertinent ED information and results. Discussed pt dx and plan of tx. Gave pt all f/u and return to the ED instructions. All questions and concerns were addressed at this time. Pt expresses understanding of information and instructions, and is comfortable with plan to discharge. Pt is stable for discharge.    I discussed with patient and/or family/caretaker that evaluation in the ED does not suggest any emergent or life threatening medical conditions requiring immediate intervention beyond what was provided in the ED, and I believe patient is safe for discharge.  Regardless, an unremarkable evaluation in the ED does not preclude the development or presence of a serious of life threatening condition. As such, patient was instructed to return immediately for any worsening or change in current symptoms.    ED Medication(s):  Medications   albuterol-ipratropium 2.5 mg-0.5 mg/3 mL nebulizer solution 3 mL (3 mLs Nebulization Given 3/3/19 0225)   azithromycin 500 mg in dextrose 5 % 250 mL IVPB (ready to  mix system) (0 mg Intravenous Stopped 3/3/19 0340)   sodium chloride 0.9% bolus 500 mL (0 mLs Intravenous Stopped 3/3/19 0310)   albuterol-ipratropium 2.5 mg-0.5 mg/3 mL nebulizer solution 3 mL (3 mLs Nebulization Given 3/3/19 7918)   ondansetron injection 4 mg (4 mg Intravenous Given 3/3/19 2669)     Discharge Medication List as of 3/3/2019  4:09 AM      START taking these medications    Details   !! azithromycin (Z-AMALIA) 250 MG tablet Take 1 tablet (250 mg total) by mouth once daily. Take first 2 tablets together, then 1 every day until finished., Starting Sun 3/3/2019, Print      predniSONE (DELTASONE) 50 MG Tab Take 1 tablet (50 mg total) by mouth once daily. for 7 days, Starting Sun 3/3/2019, Until Sun 3/10/2019, Print       !! - Potential duplicate medications found. Please discuss with provider.              Medical Decision Making    Medical Decision Making:   Clinical Tests:   Lab Tests: Ordered and Reviewed  Radiological Study: Ordered and Reviewed  Medical Tests: Ordered and Reviewed           Scribe Attestation:   Scribe #1: I performed the above scribed service and the documentation accurately describes the services I performed. I attest to the accuracy of the note.    Attending:   Physician Attestation Statement for Scribe #1: I, Roland Anderson MD, personally performed the services described in this documentation, as scribed by Laney Hong, in my presence, and it is both accurate and complete.          Clinical Impression       ICD-10-CM ICD-9-CM   1. COPD exacerbation J44.1 491.21   2. SOB (shortness of breath) R06.02 786.05   3. Acute bronchitis, unspecified organism J20.9 466.0       Disposition:   Disposition: Discharged  Condition: Stable         Roland Anderson MD  03/03/19 8105

## 2019-03-03 NOTE — ED NOTES
Pt states that he is ready for d/c. NAD noted. RR e/u, airway open and patent. AAO x 3. VSS. Spouse at bedside. MD Anderson states to d/c w/ an SpO2 >88% on 3L. Pt currently at 95%. Pt states that he did not bring supplemental O2 for ride home but is requesting to continue with d/c. Will continue with d/c at this time.

## 2019-03-03 NOTE — ED NOTES
Pt reports relief in SOB. NAD noted. RR e/u, airway open and patent. AAO x 3. Spouse at bedside. Will continue to monitor.

## 2019-03-12 ENCOUNTER — TELEPHONE (OUTPATIENT)
Dept: PULMONOLOGY | Facility: CLINIC | Age: 69
End: 2019-03-12

## 2019-03-29 PROBLEM — J96.12 CHRONIC RESPIRATORY FAILURE WITH HYPOXIA AND HYPERCAPNIA: Status: ACTIVE | Noted: 2019-03-29

## 2019-03-29 PROBLEM — J96.11 CHRONIC RESPIRATORY FAILURE WITH HYPOXIA AND HYPERCAPNIA: Status: ACTIVE | Noted: 2019-03-29

## 2019-03-29 NOTE — PROGRESS NOTES
prescription for trilogy vent sent to [facility]    Subjective:       Patient ID: Uzair Mosley is a 68 y.o. male.    Chief Complaint: No chief complaint on file.    HPI COPD  He presents for evaluation and treatment of COPD. The patient is not currently having symptoms / an exacerbation. Current symptoms include chronic dyspnea, cough productive of clear sputum in small amounts and wheezing. Symptoms have been present since several weeks ago and have been unchanged. He denies chills and fever. Associated symptoms include poor exercise tolerance and shortness of breath.  This episode appears to have been triggered by cold air and exercise. Treatments tried for the current exacerbation: albuterol inhaler and oxygen. The patient has been having similar episodes for approximately 8 years. He uses 1 pillows at night. Patient currently is on oxygen at 2 L/min per nasal cannula.. The patient is having no constitutional symptoms, denying fever, chills, anorexia, or weight loss. The patient has been hospitalized for this condition before. He quit smoking approximately many year ago. The patient is experiencing exercise intolerance (difficulty walking 1 blocks on flat ground).      Failed use of adviar disk - dry powder is irritating  prescription for adviar MDI    Chronic hypoxic respiratory  Failure:  The patient has been prescribed oxygen for chronic respiratory condition . He has severe lung disease and hypoxia related symptoms. Other treatments including inhaled bronchodilators, nebulizer treatments have been tried. Patient is mobile within the home and would benefit from prescribed oxygen.Patient has documented nocturnal hypoxemia and signs and symptoms of nocturnal hypoxia which include but are not limited to fatigue, daytime hypersomnolence , non restorative sleep, edema.    Is not happy with [facility] - want to have a new supplier of [employer] EQUIPMENT  New prescription for oxygen written    Patient has chronic  respiratory failure due to  Chronic Obstructive Pulmonary Disease. Patient will need a targeted tidal volume which cannot be provided via Continuous Positive Airway Pressure. BiPAP will not lower the patient's CO2 levels . This patient will require a set tidal volume. Non-Invasive ventilation is being ordered due the the severe nature of this patient 's disease. Without this therapy the patient is at risk for terminal decline due to progressive respiratory failure.      Results for DIEGO TREADWELL (MRN [id number]) as of 3/29/2019 16:55   Ref. Range 3/3/2019 03:33   POC PH Latest Ref Range: 7.35 - 7.45  7.322 (L)   POC PCO2 Latest Ref Range: 35 - 45 mmHg 58.0 (HH)   POC PO2 Latest Ref Range: 80 - 100 mmHg 65 (L)   POC BE Latest Ref Range: -2 to 2 mmol/L 4   POC HCO3 Latest Ref Range: 24 - 28 mmol/L 30.0 (H)   POC SATURATED O2 Latest Ref Range: 95 - 100 % 90 (L)   Flow Unknown 3   Sample Unknown ARTERIAL   DelSys Unknown Nasal Can   Allens Test Unknown Pass   Site Unknown LR   Mode Unknown SPONT           Past Medical History:   Diagnosis Date    Adjustment disorder with depressed mood 05/25/2018    COPD (chronic obstructive pulmonary disease) 10/08/2012    Disorientation 11/26/2012    Essential hypertension 02/10/2017    Hypoxemia requiring supplemental oxygen 11/26/2012    Obstructive chronic bronchitis with exacerbation 05/21/2013    Pleural effusion 10/08/2012    Sedative dependence 02/10/2017    Sleep disorder 02/10/2017    SOB (shortness of breath) 10/30/2012    Uncomplicated opioid dependence 02/10/2017     No past surgical history on file.  Social History     Socioeconomic History    Marital status:      Spouse name: Not on file    Number of children: Not on file    Years of education: Not on file    Highest education level: Not on file   Occupational History    Not on file   Social Needs    Financial resource strain: Not on file    Food insecurity:     Worry: Not on file     Inability:  Not on file    Transportation needs:     Medical: Not on file     Non-medical: Not on file   Tobacco Use    Smoking status: Former Smoker     Packs/day: 2.00     Years: 24.00     Pack years: 48.00     Start date: 1966     Last attempt to quit: 1992     Years since quittin.2    Smokeless tobacco: Never Used   Substance and Sexual Activity    Alcohol use: No     Frequency: Never    Drug use: Not on file    Sexual activity: Not on file   Lifestyle    Physical activity:     Days per week: Not on file     Minutes per session: Not on file    Stress: Not on file   Relationships    Social connections:     Talks on phone: Not on file     Gets together: Not on file     Attends [employer] service: Not on file     Active member of club or organization: Not on file     Attends meetings of clubs or organizations: Not on file     Relationship status: Not on file    Intimate partner violence:     Fear of current or ex partner: Not on file     Emotionally abused: Not on file     Physically abused: Not on file     Forced sexual activity: Not on file   Other Topics Concern    Not on file   Social History Narrative    Not on file     Review of Systems   Constitutional: Positive for fatigue. Negative for fever.   HENT: Positive for postnasal drip, rhinorrhea and congestion.    Eyes: Negative for redness and itching.   Respiratory: Positive for cough, sputum production, shortness of breath, dyspnea on extertion, use of rescue inhaler and Paroxysmal Nocturnal Dyspnea.    Cardiovascular: Negative for chest pain, palpitations and leg swelling.   Genitourinary: Negative for difficulty urinating and hematuria.   Endocrine: Negative for cold intolerance and heat intolerance.    Skin: Negative for rash.   Gastrointestinal: Negative for nausea and abdominal pain.   Neurological: Negative for dizziness, syncope, weakness and light-headedness.   Hematological: Negative for adenopathy. Does not bruise/bleed easily.    Psychiatric/Behavioral: Negative for sleep disturbance. The patient is not nervous/anxious.        Objective:      Physical Exam   Constitutional: He is oriented to person, place, and time. He appears well-developed and well-nourished.   HENT:   Head: Normocephalic and atraumatic.   Mouth/Throat: Oropharyngeal exudate present.   Eyes: Pupils are equal, round, and reactive to light. Conjunctivae are normal.   Neck: Neck supple. No JVD present. No tracheal deviation present. No thyromegaly present.   Cardiovascular: Normal rate, regular rhythm and normal heart sounds.   No murmur heard.  Pulmonary/Chest: Effort normal. He has decreased breath sounds. He has wheezes in the right lower field and the left lower field. He has no rhonchi. He has no rales.   Abdominal: Soft. Bowel sounds are normal.   Musculoskeletal: Normal range of motion. He exhibits no edema or tenderness.   Lymphadenopathy:     He has no cervical adenopathy.   Neurological: He is alert and oriented to person, place, and time.   Skin: Skin is warm and dry.   Nursing note and vitals reviewed.    Personal Diagnostic Review  Chest X-Ray: I personally reviewed the films and findings are:, air trapping/emphysema  Pulmonary function tests:  Severe obstruction  Conditions of testing: Stable outpatient  Results:  88% with exercise  92 % on room air  Hypoxemia with exercise.  Corrected with supplemental oxygen.  Diagnosis: Chronic respiratory failure      Moy Vargas MD  Pulmonary / Critical Care Medicine    Pulmonary Studies Review 3/3/2019 3/3/2019 3/3/2019 3/3/2019 3/3/2019 3/3/2019 1/9/2019   SpO2 95 93 93 90 96 92 96   Height - - - - - 69 69.000   Weight - - - - - 3024 3024   BMI (Calculated) - - - - - 28 28   Predicted Distance - - - - - 344.12 344.12   Predicted Distance Meters (Calculated) - - - - - 515.74 515.74     No flowsheet data found.      Assessment:         1.0 Chronic hypoxemic and hypercarbic respiratory failure                      Outpatient Encounter Medications as of 3/29/2019   Medication Sig Dispense Refill    albuterol (PROVENTIL) 2.5 mg /3 mL (0.083 %) nebulizer solution Take 3 mLs (2.5 mg total) by nebulization every 6 (six) hours while awake. 360 mL 11    albuterol (PROVENTIL/VENTOLIN HFA) 90 mcg/actuation inhaler Inhale 1-2 puffs into the lungs every 4 (four) hours as needed. 1 Inhaler 11    aspirin (ECOTRIN) 81 MG EC tablet Take 81 mg by mouth Daily.      azithromycin (Z-AMALIA) 250 MG tablet Take 1 tablet (250 mg total) by mouth once daily. Take first 2 tablets together, then 1 every day until finished. 6 tablet 0    azithromycin (ZITHROMAX Z-AMALIA) 250 MG tablet 500 mg on day 1 (two tablets) followed by 250 mg once daily on days 2-5 6 tablet 1    clonazePAM (KLONOPIN) 1 MG tablet Take 1 mg by mouth every evening.      diclofenac sodium (VOLTAREN) 1 % Gel Apply topically 4 (four) times daily as needed.      fluticasone-salmeterol (ADVAIR HFA) 115-21 mcg/actuation HFAA Inhale 2 puffs into the lungs 2 (two) times daily. Controller 1 Inhaler 11    HYDROcodone-acetaminophen (NORCO)  mg per tablet Take 1 tablet by mouth every 8 (eight) hours as needed.      lisinopril-hydrochlorothiazide (PRINZIDE,ZESTORETIC) 10-12.5 mg per tablet Take 1 tablet by mouth Daily.      naproxen (NAPROSYN) 500 MG tablet Take 1 tablet by mouth 2 (two) times daily as needed.      temazepam (RESTORIL) 30 mg capsule Take 30 mg by mouth nightly as needed.      traZODone (DESYREL) 150 MG tablet Take 150 mg by mouth nightly as needed.      triamcinolone acetonide 0.1% (KENALOG) 0.1 % cream Apply to affected area 2 times daily      venlafaxine (EFFEXOR-XR) 150 MG Cp24 Take 1 capsule by mouth Daily.      verapamil (CALAN-SR) 120 MG CR tablet TK 1 T PO Q NIGHT  1     No facility-administered encounter medications on file as of 3/29/2019.      No orders of the defined types were placed in this encounter.    Plan:       Requested Prescriptions       No prescriptions requested or ordered in this encounter     There are no diagnoses linked to this encounter.  No follow-ups on file.    MEDICAL DECISION MAKING: Moderate to high complexity.  Overall, the multiple problems listed are of moderate to high severity that may impact quality of life and activities of daily living. Side effects of medications, treatment plan as well as options and alternatives reviewed and discussed with patient. There was counseling of patient concerning these issues.    Total time spent in face to face counseling and coordination of care - 60 minutes over 50% of time was used in discussion of prognosis, risks, benefits of treatment, instructions and compliance with regimen . Discussion with other physicians or health care providers occurred.

## 2019-04-02 ENCOUNTER — OFFICE VISIT (OUTPATIENT)
Dept: PULMONOLOGY | Facility: CLINIC | Age: 69
End: 2019-04-02
Payer: MEDICARE

## 2019-04-02 ENCOUNTER — CLINICAL SUPPORT (OUTPATIENT)
Dept: PULMONOLOGY | Facility: CLINIC | Age: 69
End: 2019-04-02
Payer: MEDICARE

## 2019-04-02 VITALS
WEIGHT: 187 LBS | HEIGHT: 69 IN | DIASTOLIC BLOOD PRESSURE: 56 MMHG | HEART RATE: 92 BPM | RESPIRATION RATE: 20 BRPM | HEIGHT: 69 IN | OXYGEN SATURATION: 97 % | BODY MASS INDEX: 27.91 KG/M2 | BODY MASS INDEX: 27.7 KG/M2 | SYSTOLIC BLOOD PRESSURE: 102 MMHG

## 2019-04-02 DIAGNOSIS — J96.12 CHRONIC RESPIRATORY FAILURE WITH HYPOXIA AND HYPERCAPNIA: Primary | ICD-10-CM

## 2019-04-02 DIAGNOSIS — J43.9 NOCTURNAL HYPOXEMIA DUE TO EMPHYSEMA: ICD-10-CM

## 2019-04-02 DIAGNOSIS — J96.11 CHRONIC RESPIRATORY FAILURE WITH HYPOXIA AND HYPERCAPNIA: Primary | ICD-10-CM

## 2019-04-02 DIAGNOSIS — R09.02 EXERCISE HYPOXEMIA: ICD-10-CM

## 2019-04-02 DIAGNOSIS — J44.9 CHRONIC OBSTRUCTIVE PULMONARY DISEASE, UNSPECIFIED COPD TYPE: ICD-10-CM

## 2019-04-02 DIAGNOSIS — G47.36 NOCTURNAL HYPOXEMIA DUE TO EMPHYSEMA: ICD-10-CM

## 2019-04-02 DIAGNOSIS — J43.1 PANLOBULAR EMPHYSEMA: ICD-10-CM

## 2019-04-02 PROCEDURE — 99999 PR PBB SHADOW E&M-EST. PATIENT-LVL I: CPT | Mod: PBBFAC,,,

## 2019-04-02 PROCEDURE — 99999 PR PBB SHADOW E&M-EST. PATIENT-LVL III: ICD-10-PCS | Mod: PBBFAC,,, | Performed by: INTERNAL MEDICINE

## 2019-04-02 PROCEDURE — 99213 OFFICE O/P EST LOW 20 MIN: CPT | Mod: PBBFAC,27 | Performed by: INTERNAL MEDICINE

## 2019-04-02 PROCEDURE — 99999 PR PBB SHADOW E&M-EST. PATIENT-LVL I: ICD-10-PCS | Mod: PBBFAC,,,

## 2019-04-02 PROCEDURE — 94618 PULMONARY STRESS TESTING: ICD-10-PCS | Mod: 26,S$PBB,, | Performed by: INTERNAL MEDICINE

## 2019-04-02 PROCEDURE — 94618 PULMONARY STRESS TESTING: CPT | Mod: 26,S$PBB,, | Performed by: INTERNAL MEDICINE

## 2019-04-02 PROCEDURE — 94618 PULMONARY STRESS TESTING: CPT | Mod: PBBFAC

## 2019-04-02 PROCEDURE — 99999 PR PBB SHADOW E&M-EST. PATIENT-LVL III: CPT | Mod: PBBFAC,,, | Performed by: INTERNAL MEDICINE

## 2019-04-02 PROCEDURE — 99215 PR OFFICE/OUTPT VISIT, EST, LEVL V, 40-54 MIN: ICD-10-PCS | Mod: 25,S$PBB,, | Performed by: INTERNAL MEDICINE

## 2019-04-02 PROCEDURE — 99215 OFFICE O/P EST HI 40 MIN: CPT | Mod: 25,S$PBB,, | Performed by: INTERNAL MEDICINE

## 2019-04-02 PROCEDURE — 99211 OFF/OP EST MAY X REQ PHY/QHP: CPT | Mod: PBBFAC

## 2019-04-02 RX ORDER — KETOCONAZOLE 20 MG/G
CREAM TOPICAL
Refills: 3 | COMMUNITY
Start: 2019-02-04

## 2019-04-02 RX ORDER — TERBINAFINE HYDROCHLORIDE 250 MG/1
TABLET ORAL
Refills: 0 | COMMUNITY
Start: 2019-03-16

## 2019-04-02 RX ORDER — HYDROCORTISONE 25 MG/G
CREAM TOPICAL
Refills: 3 | COMMUNITY
Start: 2019-02-04

## 2019-04-02 NOTE — PROCEDURES
"[facility]  Six Minute Walk     SUMMARY     Ordering Provider: Dr Slater      Performing nurse/tech/RT: [email or web address] a  Diagnosis: COPD  Height: 5' 9" (175.3 cm)  Weight: 84.8 kg (187 lb)  BMI (Calculated): 27.7   Patient Race:             Phase Oxygen Assessment Supplemental O2 Heart   Rate Blood Pressure Obdulia Dyspnea Scale Rating   Resting 95 % Room Air 85 bpm 115/59 2   Exercise        Minute        1 87 % Room Air 103 bpm     2 90 % 2 L/M 102 bpm     3 93 % 2 L/M 107 bpm     4 93 % 2 L/M 107 bpm     5 93 % 2 L/M 113 bpm     6  91 % 2 L/M 112 bpm 116/57 3   Recovery        Minute        1 93 % 2 L/M 108 bpm     2 95 % 2 L/M 102 bpm     3 95 % 2 L/M 94 bpm     4 96 % 2 L/M 95 bpm 98/61 2     Six Minute Walk Summary  6MWT Status: completed without stopping  Patient Reported: Dyspnea     Interpretation:  Did the patient stop or pause?: No            Total Time Walked (Calculated): 360 seconds  Final Partial Lap Distance (feet): 0 feet  Total Distance Meters (Calculated): 243.84 meters  Predicted Distance Meters (Calculated): 517.33 meters  Percentage of Predicted (Calculated): 47.13  Peak VO2 (Calculated): 11.3  Mets: 3.23  Has The Patient Had a Previous Six Minute Walk Test?: No       Previous 6MWT Results  Has The Patient Had a Previous Six Minute Walk Test?: No      Interpretation:  Total distance walked in six minutes is moderately reduced indicating a reduction in overall  functional capacity. There was  significant oxygen desaturation to 87% on room air. Clinical correlation suggested.    Moy Vargas MD    Mild exercise-induced hypoxemia described as an arterial oxygen saturation of 93-95% (or 3-4% less than at rest), moderate exercise-induced hypoxemia as 89-93%, and severe exercise induced hypoxemia as < 89% O2 saturation.  Medicare Criteria Comments:   When arterial oxygen saturation is at or below 88% during exercise (severe exercise induced hypoxemia) then the patient falls under " Medicare Group 1 criteria for supplemental oxygen.  Details about Medicare Group Criteria coverage can be found at http://www.cms.hhs.gov/manuals/downloads/

## 2019-04-02 NOTE — PROGRESS NOTES
Subjective:       Patient ID: Uzair Mosley is a 68 y.o. male.    Chief Complaint: COPD    HPI COPD  He presents for evaluation and treatment of COPD. The patient is not currently have symptoms / an exacerbation. The patient has COPD for approximately 25 years ago after chlorinated at [employer]. Symptoms in previous episodes have included dyspnea, cough and wheezing, and typically last 2 weeks. Previous episodes have been exacerbated by any exercise, housework, moderate activity, strenuous activity and walking. Current treatment includes albuterol nebulizer, inhaled steroid and oxygen, which generally provides some relief of symptoms.   He uses 1 pillows at night. Patient currently is on oxygen at 2 L/min per nasal cannula.. The patient is having no constitutional symptoms, denying fever, chills, anorexia, or weight loss. The patient has been hospitalized for this condition before. He quit smoking approximately 25 years ago. The patient is experiencing exercise intolerance (difficulty walking 1 blocks on flat ground).    Wants a battery operated portable oxygen device    Problems with memory  Uzair Mosley is a 68 y.o. male here for evaluation of memory problems. He is accompanied by wife. Primary caregiver is patient and wife. Patient lives with their spouse. The family and the patient identify problems with changes in short term memory. Family and patient report problems with agitation. Family and patient are concerned about  Low oxygen affecting memoary, however, they are not concerned about wandering, driving and inability to maintain adequate nutrition. Medication administration: patient self medicates.      Chronic hypoxemic and hypercarbic respiratory failure:  Patient has chronic respiratory failure due to Chronic Obstructive Pulmonary Disease. Patient will need a targeted tidal volume which cannot be provided via Continuous Positive Airway Pressure. BiPAP will not lower the patient's CO2 levels .  This patient will require a set tidal volume. Non-Invasive ventilation is being ordered due the the severe nature of this patient 's disease. Without this therapy the patient is at risk for terminal decline due to progressive respiratory failure.  Patient order for nocturnal volume ventilation reviewed.  Ventilator needed due to chronic respiratory failure unresponsive to other modalities of treatment including but not limited to intensive bronchodilator therapy, steroids, diuretics   secretion mobilization, smoking cessation and antibiotics.  Patient is stable outpatient at time of order.  Instructed for daytime use of ventilator as needed.  Home BiPAP treatment has been tried and is insufficient due to severity of disease.  Instructed to bleed in oxygen at 2 liters/min.      Past Medical History:   Diagnosis Date    Adjustment disorder with depressed mood 05/25/2018    COPD (chronic obstructive pulmonary disease) 10/08/2012    Disorientation 11/26/2012    Essential hypertension 02/10/2017    Hypoxemia requiring supplemental oxygen 11/26/2012    Obstructive chronic bronchitis with exacerbation 05/21/2013    Pleural effusion 10/08/2012    Sedative dependence 02/10/2017    Sleep disorder 02/10/2017    SOB (shortness of breath) 10/30/2012    Uncomplicated opioid dependence 02/10/2017     History reviewed. No pertinent surgical history.  Social History     Socioeconomic History    Marital status:      Spouse name: Not on file    Number of children: Not on file    Years of education: Not on file    Highest education level: Not on file   Occupational History    Not on file   Social Needs    Financial resource strain: Not on file    Food insecurity:     Worry: Not on file     Inability: Not on file    Transportation needs:     Medical: Not on file     Non-medical: Not on file   Tobacco Use    Smoking status: Former Smoker     Packs/day: 2.00     Years: 24.00     Pack years: 48.00     Start date:  1966     Last attempt to quit: 1992     Years since quittin.2    Smokeless tobacco: Never Used   Substance and Sexual Activity    Alcohol use: No     Frequency: Never    Drug use: Not on file    Sexual activity: Not on file   Lifestyle    Physical activity:     Days per week: Not on file     Minutes per session: Not on file    Stress: Not on file   Relationships    Social connections:     Talks on phone: Not on file     Gets together: Not on file     Attends [employer] service: Not on file     Active member of club or organization: Not on file     Attends meetings of clubs or organizations: Not on file     Relationship status: Not on file   Other Topics Concern    Not on file   Social History Narrative    Not on file     Review of Systems   Constitutional: Positive for fatigue. Negative for fever.   HENT: Positive for postnasal drip, rhinorrhea and congestion.    Eyes: Negative for redness and itching.   Respiratory: Positive for cough, sputum production, shortness of breath, dyspnea on extertion, use of rescue inhaler and Paroxysmal Nocturnal Dyspnea.    Cardiovascular: Negative for chest pain, palpitations and leg swelling.   Genitourinary: Negative for difficulty urinating and hematuria.   Endocrine: Negative for cold intolerance and heat intolerance.    Musculoskeletal: Positive for arthralgias.   Skin: Negative for rash.   Gastrointestinal: Negative for nausea and abdominal pain.   Neurological: Positive for light-headedness. Negative for dizziness, syncope and weakness.   Hematological: Negative for adenopathy. Does not bruise/bleed easily.   Psychiatric/Behavioral: Negative for sleep disturbance. The patient is not nervous/anxious.        Objective:      Physical Exam   Constitutional: He is oriented to person, place, and time. He appears well-developed and well-nourished.   HENT:   Head: Normocephalic and atraumatic.   Eyes: Pupils are equal, round, and reactive to light. Conjunctivae  are normal.   Neck: Neck supple. No JVD present. No tracheal deviation present. No thyromegaly present.   Cardiovascular: Normal rate and regular rhythm.   No murmur heard.  Pulmonary/Chest: He has decreased breath sounds. He has wheezes in the right lower field and the left lower field. He has no rhonchi. He has no rales.   Abdominal: Soft. Bowel sounds are normal.   Musculoskeletal: Normal range of motion. He exhibits no edema or tenderness.   Lymphadenopathy:     He has no cervical adenopathy.   Neurological: He is alert and oriented to person, place, and time.   Skin: Skin is warm and dry.   Nursing note and vitals reviewed.    Personal Diagnostic Review  Chest X-Ray: I personally reviewed the films and findings are:, air trapping/emphysema  Pulmonary function tests:  Severe obstruction    Interpretation:  Conditions of testing: Stable outpatient  Results: O2 sat 87%  With exercise  2 liters correct O2 sat to 93%  Hypoxemia with exercise.  Corrected with supplemental oxygen at 2 liters  Diagnosis: Chronic hypoxic respiratory failure      Moy Vargas MD  Pulmonary / Critical Care Medicine      REVIEW OF ABG:  Results for DIEGO TREADWELL (MRN [id number]) as of 4/2/2019 16:50   Ref. Range 3/3/2019 03:33   POC PH Latest Ref Range: 7.35 - 7.45  7.322 (L)   POC PCO2 Latest Ref Range: 35 - 45 mmHg 58.0 (HH)   POC PO2 Latest Ref Range: 80 - 100 mmHg 65 (L)   POC BE Latest Ref Range: -2 to 2 mmol/L 4   POC HCO3 Latest Ref Range: 24 - 28 mmol/L 30.0 (H)   POC SATURATED O2 Latest Ref Range: 95 - 100 % 90 (L)   Flow Unknown 3   Sample Unknown ARTERIAL   DelSys Unknown Nasal Can   Allens Test Unknown Pass   Site Unknown LR   Mode Unknown SPONT         Pulmonary Studies Review 4/2/2019 4/2/2019 3/3/2019 3/3/2019 3/3/2019 3/3/2019 3/3/2019   SpO2 97 - 95 93 93 90 96   Ordering Provider - Dr Slater - - - - -   Performing nurse/tech/RT - [facility] - - - - -   Diagnosis - COPD - - - - -   Height 69.000 69 - - - - -   Weight -  2992 - - - - -   BMI (Calculated) - 27.7 - - - - -   Predicted Distance 501.2 345.8 - - - - -   Patient Race -  - - - - -   6MWT Status - completed without stopping - - - - -   Patient Reported - Dyspnea - - - - -   Was O2 used? - Yes - - - - -   Delivery Method - Cannula - - - - -   6MW Distance walked (feet) - 800 - - - - -   Distance walked (meters) - 243.84 - - - - -   Did patient stop? - No - - - - -   Type of assistive device(s) used? - no assistive devices - - - - -   Is extra documentation required for this patient? - Yes - - - - -   Oxygen Saturation - 95 - - - - -   Supplemental Oxygen - Room Air - - - - -   Heart Rate - 85 - - - - -   Blood Pressure - 115/59 - - - - -   Obdulia Dyspnea Rating  - light - - - - -   Oxygen Saturation - 91 - - - - -   Supplemental Oxygen - 2 L/M - - - - -   Heart Rate - 112 - - - - -   Blood Pressure - 116/57 - - - - -   Obdulia Dyspnea Rating  - moderate - - - - -   Recovery Time (seconds) - 240 - - - - -   Oxygen Saturation - 96 - - - - -   Supplemental Oxygen - 2 L/M - - - - -   Heart Rate - 95 - - - - -   Blood Pressure - 98/61 - - - - -   Obdulia Dyspnea Rating  - light - - - - -   Is procedure ready for interpretation? - Yes - - - - -   Did the patient stop or pause? - No - - - - -   Total Time Walked (Calculated) - 360 - - - - -   Total Laps Walked - 4 - - - - -   Final Partial Lap Distance (feet) - 0 - - - - -   Total Distance Feet (Calculated) - 800 - - - - -   Total Distance Meters (Calculated) - 243.84 - - - - -   Predicted Distance Meters (Calculated) - 517.33 - - - - -   Percentage of Predicted (Calculated) - 47.13 - - - - -   Peak VO2 (Calculated) - 11.3 - - - - -   Mets - 3.23 - - - - -   Has The Patient Had a Previous Six Minute Walk Test? - No - - - - -   Oxygen Qualification? - Yes - - - - -     No flowsheet data found.      Assessment:       1. Chronic respiratory failure with hypoxia and hypercapnia    2. Exercise hypoxemia    3. Panlobular emphysema    4.  Nocturnal hypoxemia due to emphysema        Outpatient Encounter Medications as of 4/2/2019   Medication Sig Dispense Refill    albuterol (PROVENTIL) 2.5 mg /3 mL (0.083 %) nebulizer solution Take 3 mLs (2.5 mg total) by nebulization every 6 (six) hours while awake. 360 mL 11    albuterol (PROVENTIL/VENTOLIN HFA) 90 mcg/actuation inhaler Inhale 1-2 puffs into the lungs every 4 (four) hours as needed. 1 Inhaler 11    aspirin (ECOTRIN) 81 MG EC tablet Take 81 mg by mouth Daily.      azithromycin (Z-AMALIA) 250 MG tablet Take 1 tablet (250 mg total) by mouth once daily. Take first 2 tablets together, then 1 every day until finished. 6 tablet 0    azithromycin (ZITHROMAX Z-AMALIA) 250 MG tablet 500 mg on day 1 (two tablets) followed by 250 mg once daily on days 2-5 6 tablet 1    clonazePAM (KLONOPIN) 1 MG tablet Take 1 mg by mouth every evening.      diclofenac sodium (VOLTAREN) 1 % Gel Apply topically 4 (four) times daily as needed.      fluticasone-salmeterol (ADVAIR HFA) 115-21 mcg/actuation HFAA Inhale 2 puffs into the lungs 2 (two) times daily. Controller 1 Inhaler 11    HYDROcodone-acetaminophen (NORCO)  mg per tablet Take 1 tablet by mouth every 8 (eight) hours as needed.      lisinopril-hydrochlorothiazide (PRINZIDE,ZESTORETIC) 10-12.5 mg per tablet Take 1 tablet by mouth Daily.      naproxen (NAPROSYN) 500 MG tablet Take 1 tablet by mouth 2 (two) times daily as needed.      temazepam (RESTORIL) 30 mg capsule Take 30 mg by mouth nightly as needed.      traZODone (DESYREL) 150 MG tablet Take 150 mg by mouth nightly as needed.      triamcinolone acetonide 0.1% (KENALOG) 0.1 % cream Apply to affected area 2 times daily      venlafaxine (EFFEXOR-XR) 150 MG Cp24 Take 1 capsule by mouth Daily.      verapamil (CALAN-SR) 120 MG CR tablet TK 1 T PO Q NIGHT  1    hydrocortisone 2.5 % cream APPLY AA BID PRN  3    ketoconazole (NIZORAL) 2 % cream APPLY TO RASH BID PRN  3    terbinafine HCl (LAMISIL) 250  "mg tablet TK 1 T PO QD  0     No facility-administered encounter medications on file as of 4/2/2019.      Orders Placed This Encounter   Procedures    OXYGEN FOR HOME USE     Battery operated portable device - [employer]OGEN  Referred to [facility] company:  [employer].  [address] [id number]  [phone]  Fax [phone]     Order Specific Question:   Liter Flow     Answer:   2     Order Specific Question:   Duration     Answer:   With activity     Order Specific Question:   Qualifying SpO2:     Answer:   87%     Order Specific Question:   Testing done at:     Answer:   Exercise/Activity     Order Specific Question:   Route     Answer:   nasal cannula     Order Specific Question:   Portable mode:     Answer:   pulse dose acceptable     Order Specific Question:   Device     Answer:   home concentrator with portable unit     Order Specific Question:   Length of need (in months):     Answer:   99 mos     Order Specific Question:   Patient condition with qualifying saturation     Answer:   COPD     Order Specific Question:   Height:     Answer:   5' 9" (1.753 m)     Order Specific Question:   Weight:     Answer:   187 lb     Order Specific Question:   Does patient have medical equipment at home?     Answer:   portable oxygen     Order Specific Question:   Alternative treatment measures have been tried or considered and deemed clinically ineffective.     Answer:   Yes    VENTILATOR FOR HOME USE     Referred to [facility] company:  [employer].  [address] [id number]  [phone]  Fax [phone]    Bleed in oxygen at 2 liters at night    Download ventilation reports with [employer] software download frequency - monthly  Hours of use 8  Continuous  during sleep  Pmax has to be the sum of EPAP min and PS max  Pmax = EPAP min + PS Max     Order Specific Question:   Height:     Answer:   5' 9" (1.753 m)     Order Specific Question:   Weight:     Answer:   187 lb     Order Specific Question:   Does " patient have medical equipment at home?     Answer:   portable oxygen     Order Specific Question:   Length of need (1-99 months):     Answer:   99     Order Specific Question:   Interface needed:     Answer:   Full face mask     Order Specific Question:   Mode:     Answer:   AVAPS-AE     Order Specific Question:   Rate:     Answer:   12     Comments:   auto     Order Specific Question:   Tidal Volume     Answer:   400     Comments:   8 mg/kg ideal body weight     Order Specific Question:   PEEP - EPAP     Answer:   4.0 CM/H2O     Order Specific Question:   Pressure support - IPAP     Answer:   14     Comments:   EPAP max 14, EPAP min 4     Order Specific Question:   FiO2%     Answer:   21     Order Specific Question:   Humidifier needed?     Answer:   Yes    X-Ray Chest PA And Lateral     Standing Status:   Future     Standing Expiration Date:   10/2/2020     Order Specific Question:   Reason for Exam:     Answer:   SOB    Spirometry without Bronchodilator     Standing Status:   Future     Standing Expiration Date:   10/2/2020     Plan:       Requested Prescriptions      No prescriptions requested or ordered in this encounter     Chronic respiratory failure with hypoxia and hypercapnia  -     OXYGEN FOR HOME USE  -     VENTILATOR FOR HOME USE  -     X-Ray Chest PA And Lateral; Future; Expected date: 04/02/2019    Exercise hypoxemia  -     OXYGEN FOR HOME USE    Panlobular emphysema  -     VENTILATOR FOR HOME USE  -     X-Ray Chest PA And Lateral; Future; Expected date: 04/02/2019  -     Spirometry without Bronchodilator; Future; Expected date: 04/02/2019    Nocturnal hypoxemia due to emphysema  -     OXYGEN FOR HOME USE    Chronic respiratory failure with hypoxia and hypercapnia  4/2/2019 - stay active with oxygen  Patient has chronic respiratory failure due to Chronic Obstructive Pulmonary Disease. Patient will need a targeted tidal volume which cannot be provided via Continuous Positive Airway Pressure. BiPAP  will not lower the patient's CO2 levels . This patient will require a set tidal volume. Non-Invasive ventilation is being ordered due the the severe nature of this patient 's disease. Without this therapy the patient is at risk for terminal decline due to progressive respiratory failure.  Patient order for nocturnal volume ventilation reviewed.  Ventilator needed due to chronic respiratory failure unresponsive to other modalities of treatment including but not limited to intensive bronchodilator therapy, steroids, diuretics   secretion mobilization, smoking cessation and antibiotics.  Patient is stable outpatient at time of order.  Instructed for daytime use of ventilator as needed.  Home BiPAP treatment has been tried and is insufficient due to severity of disease.  Instructed to bleed in oxygen at 2 liters/min.          Follow up in about 7 months (around 11/4/2019) for Review randa and CXR.    MEDICAL DECISION MAKING: Moderate to high complexity.  Overall, the multiple problems listed are of moderate to high severity that may impact quality of life and activities of daily living. Side effects of medications, treatment plan as well as options and alternatives reviewed and discussed with patient. There was counseling of patient concerning these issues.    Total time spent in face to face counseling and coordination of care - 40 minutes over 50% of time was used in discussion of prognosis, risks, benefits of treatment, instructions and compliance with regimen . Discussion with other physicians or health care providers (homehealth, durable medical equipment providers).

## 2019-04-02 NOTE — ASSESSMENT & PLAN NOTE
4/2/2019 - stay active with oxygen  Patient has chronic respiratory failure due to Chronic Obstructive Pulmonary Disease. Patient will need a targeted tidal volume which cannot be provided via Continuous Positive Airway Pressure. BiPAP will not lower the patient's CO2 levels . This patient will require a set tidal volume. Non-Invasive ventilation is being ordered due the the severe nature of this patient 's disease. Without this therapy the patient is at risk for terminal decline due to progressive respiratory failure.  Patient order for nocturnal volume ventilation reviewed.  Ventilator needed due to chronic respiratory failure unresponsive to other modalities of treatment including but not limited to intensive bronchodilator therapy, steroids, diuretics   secretion mobilization, smoking cessation and antibiotics.  Patient is stable outpatient at time of order.  Instructed for daytime use of ventilator as needed.  Home BiPAP treatment has been tried and is insufficient due to severity of disease.  Instructed to bleed in oxygen at 2 liters/min.

## 2019-04-02 NOTE — LETTER
April 2, 2019      [facility]  [address] [place], [place] [address] [address]  Phone: [phone]  Fax: [phone]          Patient: Uzair Mosley   MR Number: [id number]   YOB: 1950   Date of Visit: 4/2/2019           Thank you for referring Uzair Mosley to me for evaluation. Attached you will find relevant portions of my assessment and plan of care.    If you have questions, please do not hesitate to call me. I look forward to following Uzair Mosley along with you.    Sincerely,    Moy Vargas MD    Enclosure  CC:  Phuong Brunson MD    IIf you would like to receive this communication electronically, please contact [email or web address] or [phone] to request [employer] access.    [employer] is a tool which provides read-only access to select patient information with whom you have a relationship. Its easy to use and provides real time access to review your patients record including encounter summaries, notes, results, and demographic information.    If you feel you have received this communication in error or would no longer like to receive these types of communications, please e-mail [email or web address]

## 2019-04-05 ENCOUNTER — TELEPHONE (OUTPATIENT)
Dept: PULMONOLOGY | Facility: CLINIC | Age: 69
End: 2019-04-05

## 2019-04-05 NOTE — TELEPHONE ENCOUNTER
----- Message from Sebastián Alonso sent at 4/5/2019 10:23 AM CDT -----  ..Type:  Patient Returning Call    Who Called [place] ( pt wife )   Who Left Message for Patient:  Does the patient know what this is regarding?:orders   Would the patient rather a call back or a response via [facility]? Call back   Best Call Back Number:[phone]  Additional Information: pt requesting a call from nurse to discuss orders and health concern

## 2019-04-05 NOTE — TELEPHONE ENCOUNTER
----- Message from Cayla Mike sent at 4/5/2019  1:01 PM CDT -----  Contact: Patient's wife, Manuela  Type:  Patient Returning Call    Who Called:Ms Roy  Who Left Message for Patient:Perez  Does the patient know what this is regarding?: orders for oxygen  Would the patient rather a call back or a response via [facility]? call  Best Call Back Number:[phone]  Additional Information:

## 2019-11-11 ENCOUNTER — TELEPHONE (OUTPATIENT)
Dept: PULMONOLOGY | Facility: CLINIC | Age: 69
End: 2019-11-11

## 2019-11-11 DIAGNOSIS — J44.9 CHRONIC OBSTRUCTIVE PULMONARY DISEASE, UNSPECIFIED COPD TYPE: Primary | ICD-10-CM

## 2019-11-11 NOTE — TELEPHONE ENCOUNTER
----- Message from Laura Mccormick sent at 11/11/2019  4:39 PM CST -----  Contact: Patient  .Type:  Patient Returning Call    Who Called:Patient  Who Left Message for Patient: ELLIOT  Does the patient know what this is regarding?:  Would the patient rather a call back or a response via [facility]? Call  Best Call Back Number: .[phone] (home)     Additional Information:

## 2019-11-11 NOTE — TELEPHONE ENCOUNTER
Spoke to pt and offered him appts for 11/15/19.  Pt declined and stated he does not wish to schedule any appts at this time.

## 2019-11-15 ENCOUNTER — CLINICAL SUPPORT (OUTPATIENT)
Dept: PULMONOLOGY | Facility: CLINIC | Age: 69
End: 2019-11-15
Payer: MEDICARE

## 2019-11-15 ENCOUNTER — OFFICE VISIT (OUTPATIENT)
Dept: PULMONOLOGY | Facility: CLINIC | Age: 69
End: 2019-11-15
Payer: MEDICARE

## 2019-11-15 VITALS
HEIGHT: 67 IN | SYSTOLIC BLOOD PRESSURE: 128 MMHG | WEIGHT: 183.63 LBS | OXYGEN SATURATION: 96 % | HEART RATE: 97 BPM | RESPIRATION RATE: 18 BRPM | BODY MASS INDEX: 28.82 KG/M2 | DIASTOLIC BLOOD PRESSURE: 78 MMHG | HEIGHT: 67 IN | BODY MASS INDEX: 28.82 KG/M2 | WEIGHT: 183.63 LBS

## 2019-11-15 DIAGNOSIS — J44.9 CHRONIC OBSTRUCTIVE PULMONARY DISEASE, UNSPECIFIED COPD TYPE: ICD-10-CM

## 2019-11-15 DIAGNOSIS — G47.9 SLEEP DISORDER: ICD-10-CM

## 2019-11-15 DIAGNOSIS — Z99.81 HYPOXEMIA REQUIRING SUPPLEMENTAL OXYGEN: ICD-10-CM

## 2019-11-15 DIAGNOSIS — J96.12 CHRONIC RESPIRATORY FAILURE WITH HYPOXIA AND HYPERCAPNIA: Primary | ICD-10-CM

## 2019-11-15 DIAGNOSIS — R09.02 HYPOXEMIA REQUIRING SUPPLEMENTAL OXYGEN: ICD-10-CM

## 2019-11-15 DIAGNOSIS — J96.11 CHRONIC RESPIRATORY FAILURE WITH HYPOXIA AND HYPERCAPNIA: Primary | ICD-10-CM

## 2019-11-15 PROCEDURE — 99214 OFFICE O/P EST MOD 30 MIN: CPT | Mod: 25,S$PBB,, | Performed by: INTERNAL MEDICINE

## 2019-11-15 PROCEDURE — 94618 PULMONARY STRESS TESTING: CPT | Mod: PBBFAC

## 2019-11-15 PROCEDURE — 99999 PR PBB SHADOW E&M-EST. PATIENT-LVL V: ICD-10-PCS | Mod: PBBFAC,,, | Performed by: INTERNAL MEDICINE

## 2019-11-15 PROCEDURE — 99214 PR OFFICE/OUTPT VISIT, EST, LEVL IV, 30-39 MIN: ICD-10-PCS | Mod: 25,S$PBB,, | Performed by: INTERNAL MEDICINE

## 2019-11-15 PROCEDURE — 94618 PULMONARY STRESS TESTING: CPT | Mod: 26,S$PBB,, | Performed by: INTERNAL MEDICINE

## 2019-11-15 PROCEDURE — 94618 PULMONARY STRESS TESTING: ICD-10-PCS | Mod: 26,S$PBB,, | Performed by: INTERNAL MEDICINE

## 2019-11-15 PROCEDURE — 99215 OFFICE O/P EST HI 40 MIN: CPT | Mod: PBBFAC | Performed by: INTERNAL MEDICINE

## 2019-11-15 PROCEDURE — 99999 PR PBB SHADOW E&M-EST. PATIENT-LVL V: CPT | Mod: PBBFAC,,, | Performed by: INTERNAL MEDICINE

## 2019-11-15 RX ORDER — TIOTROPIUM BROMIDE 18 UG/1
CAPSULE ORAL; RESPIRATORY (INHALATION)
COMMUNITY
End: 2020-05-21 | Stop reason: SDUPTHER

## 2019-11-15 RX ORDER — ALBUTEROL SULFATE 90 UG/1
1-2 AEROSOL, METERED RESPIRATORY (INHALATION) EVERY 4 HOURS PRN
Qty: 1 INHALER | Refills: 11 | Status: SHIPPED | OUTPATIENT
Start: 2019-11-15

## 2019-11-15 NOTE — PROCEDURES
"[facility]  Six Minute Walk     SUMMARY     Ordering Provider: Dr. Vargas   Interpreting Provider: Dr. Vargas  Performing nurse/tech/RT: ALEX Lomeli  Diagnosis: COPD  Height: 5' 7" (170.2 cm)  Weight: 83.3 kg (183 lb 10.3 oz)  BMI (Calculated): 28.8   Patient Race:             Phase Oxygen Assessment Supplemental O2 Heart   Rate Blood Pressure Obdulia Dyspnea Scale Rating   Resting 95 % Room Air 88 bpm 133/69 3   Exercise        Minute        1 93 % Room Air 110 bpm     2 90 % Room Air 110 bpm     3 91 % Room Air 118 bpm     4 91 % Room Air 118 bpm     5 91 % Room Air 120 bpm     6  91 % Room Air 121 bpm 128/78 3   Recovery        Minute        1 93 % Room Air 111 bpm     2 95 % Room Air 105 bpm     3 96 % Room Air 97 bpm     4 97 % Room Air 88 bpm 125/72 2     Six Minute Walk Summary  6MWT Status: completed without stopping  Patient Reported: Dyspnea     Interpretation:  Did the patient stop or pause?: No                                         Total Time Walked (Calculated): 360 seconds  Final Partial Lap Distance (feet): 100 feet  Total Distance Meters (Calculated): 335.28 meters  Predicted Distance Meters (Calculated): 476.39 meters  Percentage of Predicted (Calculated): 70.38  Peak VO2 (Calculated): 14.04  Mets: 4.01  Has The Patient Had a Previous Six Minute Walk Test?: Yes       Previous 6MWT Results  Has The Patient Had a Previous Six Minute Walk Test?: Yes  Date of Previous Test: 04/02/19  Total Time Walked: 360 seconds  Total Distance (meters): 243.84  Predicted Distance (meters): 517.33 meters  Percentage of Predicted: 47.13  Percent Change (Calculated): -0.38      Interpretation:  Total distance walked in six minutes is mildly reduced indicating a reduction in overall  functional capacity. There was moderate exercise induced hypoxemia to 90% on room air with significant oxygen desaturation.    Oxygen desaturation did not meet criteria for supplemental oxygen " prescription.    Clinical correlation suggested.    Moy Vargas MD    Mild exercise-induced hypoxemia described as an arterial oxygen saturation of 93-95% (or 3-4% less than at rest), moderate exercise-induced hypoxemia as 89-93%, and severe exercise induced hypoxemia as < 89% O2 saturation.  Medicare Criteria Comments:   When arterial oxygen saturation is at or below 88% during exercise (severe exercise induced hypoxemia) then the patient falls under Medicare Group 1 criteria for supplemental oxygen.  Details about Medicare Group Criteria coverage can be found at http://www.cms.hhs.gov/manuals/downloads/

## 2019-11-15 NOTE — PROGRESS NOTES
Subjective:       Patient ID: Uzair Mosley is a 69 y.o. male.    Chief Complaint: COPD    COPD   Associated symptoms include arthralgias, congestion, coughing and fatigue. Pertinent negatives include no abdominal pain, chest pain, fever, nausea, rash or weakness.    COPD  He presents for evaluation and treatment of COPD. The patient is not currently have symptoms / an exacerbation. The patient has COPD for approximately 25 years ago after chlorinated at behaview. Symptoms in previous episodes have included dyspnea, cough and wheezing, and typically last 2 weeks. Previous episodes have been exacerbated by any exercise, housework, moderate activity, strenuous activity and walking. Current treatment includes albuterol nebulizer, inhaled steroid and oxygen, which generally provides some relief of symptoms.   He uses 1 pillows at night. Patient currently is on oxygen at 2 L/min per nasal cannula.. The patient is having no constitutional symptoms, denying fever, chills, anorexia, or weight loss. The patient has been hospitalized for this condition before. He quit smoking approximately 25 years ago. The patient is experiencing exercise intolerance (difficulty walking 1 blocks on flat ground).    Has a battery operated portable oxygen device    Problems with memory persists - has not worsened since last visit   Uzair Mosley is a 69 y.o. male here for evaluation of memory problems. He is accompanied by wife. Primary caregiver is patient and wife. Patient lives with their spouse. The family and the patient identify problems with changes in short term memory. Family and patient report problems with agitation. Family and patient are concerned about  Low oxygen affecting memoary, however, they are not concerned about wandering, driving and inability to maintain adequate nutrition. Medication administration: patient self medicates.      Chronic hypoxemic and hypercarbic respiratory failure:  Patient has chronic  respiratory failure due to Chronic Obstructive Pulmonary Disease. Patient will need a targeted tidal volume which cannot be provided via Continuous Positive Airway Pressure. BiPAP will not lower the patient's CO2 levels . This patient will require a set tidal volume. Non-Invasive ventilation is being ordered due the the severe nature of this patient 's disease. Without this therapy the patient is at risk for terminal decline due to progressive respiratory failure.  Patient order for nocturnal volume ventilation reviewed.  Ventilator needed due to chronic respiratory failure unresponsive to other modalities of treatment including but not limited to intensive bronchodilator therapy, steroids, diuretics   secretion mobilization, smoking cessation and antibiotics.  Patient is stable outpatient at time of order.  Instructed for daytime use of ventilator as needed.  Home BiPAP treatment has been tried and is insufficient due to severity of disease.  Instructed to bleed in oxygen at 2 liters/min.      Past Medical History:   Diagnosis Date    Adjustment disorder with depressed mood 05/25/2018    COPD (chronic obstructive pulmonary disease) 10/08/2012    Disorientation 11/26/2012    Essential hypertension 02/10/2017    Hypoxemia requiring supplemental oxygen 11/26/2012    Obstructive chronic bronchitis with exacerbation 05/21/2013    Pleural effusion 10/08/2012    Sedative dependence 02/10/2017    Sleep disorder 02/10/2017    SOB (shortness of breath) 10/30/2012    Uncomplicated opioid dependence 02/10/2017     History reviewed. No pertinent surgical history.  Social History     Socioeconomic History    Marital status:      Spouse name: Not on file    Number of children: Not on file    Years of education: Not on file    Highest education level: Not on file   Occupational History    Not on file   Social Needs    Financial resource strain: Not on file    Food insecurity:     Worry: Not on file      Inability: Not on file    Transportation needs:     Medical: Not on file     Non-medical: Not on file   Tobacco Use    Smoking status: Former Smoker     Packs/day: 2.00     Years: 24.00     Pack years: 48.00     Start date: 1966     Last attempt to quit: 1992     Years since quittin.8    Smokeless tobacco: Never Used   Substance and Sexual Activity    Alcohol use: No     Frequency: Never    Drug use: Not on file    Sexual activity: Not on file   Lifestyle    Physical activity:     Days per week: Not on file     Minutes per session: Not on file    Stress: Not on file   Relationships    Social connections:     Talks on phone: Not on file     Gets together: Not on file     Attends Judaism service: Not on file     Active member of club or organization: Not on file     Attends meetings of clubs or organizations: Not on file     Relationship status: Not on file   Other Topics Concern    Not on file   Social History Narrative    Not on file     Review of Systems   Constitutional: Positive for fatigue. Negative for fever.   HENT: Positive for postnasal drip, rhinorrhea and congestion.    Eyes: Negative for redness and itching.   Respiratory: Positive for cough, sputum production, shortness of breath, dyspnea on extertion, use of rescue inhaler and Paroxysmal Nocturnal Dyspnea.    Cardiovascular: Negative for chest pain, palpitations and leg swelling.   Genitourinary: Negative for difficulty urinating and hematuria.   Endocrine: Negative for cold intolerance and heat intolerance.    Musculoskeletal: Positive for arthralgias.   Skin: Negative for rash.   Gastrointestinal: Negative for nausea and abdominal pain.   Neurological: Positive for light-headedness. Negative for dizziness, syncope and weakness.   Hematological: Negative for adenopathy. Does not bruise/bleed easily.   Psychiatric/Behavioral: Negative for sleep disturbance. The patient is not nervous/anxious.        Objective:      Physical  Exam   Constitutional: He is oriented to person, place, and time. He appears well-developed and well-nourished.   HENT:   Head: Normocephalic and atraumatic.   Eyes: Pupils are equal, round, and reactive to light. Conjunctivae are normal.   Neck: Neck supple. No JVD present. No tracheal deviation present. No thyromegaly present.   Cardiovascular: Normal rate and regular rhythm.   No murmur heard.  Pulmonary/Chest: He has decreased breath sounds. He has wheezes in the right lower field and the left lower field. He has no rhonchi. He has no rales.   Abdominal: Soft. Bowel sounds are normal.   Musculoskeletal: Normal range of motion. He exhibits no edema or tenderness.   Lymphadenopathy:     He has no cervical adenopathy.   Neurological: He is alert and oriented to person, place, and time.   Skin: Skin is warm and dry.   Nursing note and vitals reviewed.    Personal Diagnostic Review  Chest X-Ray: I personally reviewed the films and findings are:, air trapping/emphysema  Pulmonary function tests:  Severe obstruction    Interpretation:  Conditions of testing: Stable outpatient  Results: O2 sat 87%  With exercise  2 liters correct O2 sat to 93%  Hypoxemia with exercise.  Corrected with supplemental oxygen at 2 liters  Diagnosis: Chronic hypoxic respiratory failure      Moy Vargas MD  Pulmonary / Critical Care Medicine      REVIEW OF ABG:  Results for DIEGO TREADWELL (MRN [id number]) as of 4/2/2019 16:50   Ref. Range 3/3/2019 03:33   POC PH Latest Ref Range: 7.35 - 7.45  7.322 (L)   POC PCO2 Latest Ref Range: 35 - 45 mmHg 58.0 (HH)   POC PO2 Latest Ref Range: 80 - 100 mmHg 65 (L)   POC BE Latest Ref Range: -2 to 2 mmol/L 4   POC HCO3 Latest Ref Range: 24 - 28 mmol/L 30.0 (H)   POC SATURATED O2 Latest Ref Range: 95 - 100 % 90 (L)   Flow Unknown 3   Sample Unknown ARTERIAL   DelSys Unknown Nasal Can   Allens Test Unknown Pass   Site Unknown LR   Mode Unknown SPONT         Pulmonary Studies Review 11/15/2019 11/15/2019  4/2/2019 4/2/2019 3/3/2019 3/3/2019 3/3/2019   SpO2 96 - 97 - 95 93 93   Ordering Provider - Dr. Vargas - Dr Slater - - -   Interpreting Provider - Dr. Vargas - - - - -   Performing nurse/tech/RT - Yani, [facility] - - -   Diagnosis - COPD - COPD - - -   Height 67.000 67 69.000 69 - - -   Weight 2938.29 2938.29 - 2992 - - -   BMI (Calculated) 28.8 28.8 - 27.7 - - -   Predicted Distance 332.69 332.69 501.2 345.8 - - -   Patient Race -  -  - - -   6MWT Status - completed without stopping - completed without stopping - - -   Patient Reported - Dyspnea - Dyspnea - - -   Was O2 used? - No - Yes - - -   Delivery Method - - - Cannula - - -   6MW Distance walked (feet) - 1100 - 800 - - -   Distance walked (meters) - 335.28 - 243.84 - - -   Did patient stop? - No - No - - -   Type of assistive device(s) used? - no assistive devices - no assistive devices - - -   Is extra documentation required for this patient? - Yes - Yes - - -   Oxygen Saturation - 95 - 95 - - -   Supplemental Oxygen - Room Air - Room Air - - -   Heart Rate - 88 - 85 - - -   Blood Pressure - 133/69 - 115/59 - - -   Obdulia Dyspnea Rating  - moderate - light - - -   Oxygen Saturation - 91 - 91 - - -   Supplemental Oxygen - Room Air - 2 L/M - - -   Heart Rate - 121 - 112 - - -   Blood Pressure - 128/78 - 116/57 - - -   Obdulia Dyspnea Rating  - moderate - moderate - - -   Recovery Time (seconds) - 240 - 240 - - -   Oxygen Saturation - 97 - 96 - - -   Supplemental Oxygen - Room Air - 2 L/M - - -   Heart Rate - 88 - 95 - - -   Blood Pressure - 125/72 - 98/61 - - -   Obdulia Dyspnea Rating  - light - light - - -   Is procedure ready for interpretation? - Yes - Yes - - -   Did the patient stop or pause? - No - No - - -   Total Time Walked (Calculated) - 360 - 360 - - -   Total Laps Walked - 5 - 4 - - -   Final Partial Lap Distance (feet) - 100 - 0 - - -   Total Distance Feet (Calculated) - 1100 - 800 - - -   Total Distance Meters  (Calculated) - 335.28 - 243.84 - - -   Predicted Distance Meters (Calculated) 476.39 476.39 - 517.33 - - -   Percentage of Predicted (Calculated) - 70.38 - 47.13 - - -   Peak VO2 (Calculated) - 14.04 - 11.3 - - -   Mets - 4.01 - 3.23 - - -   Has The Patient Had a Previous Six Minute Walk Test? - Yes - No - - -   Oxygen Qualification? - No - Yes - - -     No flowsheet data found.      Assessment:       1. Chronic respiratory failure with hypoxia and hypercapnia    2. Hypoxemia requiring supplemental oxygen    3. Sleep disorder    4. Chronic obstructive pulmonary disease, unspecified COPD type        Outpatient Encounter Medications as of 11/15/2019   Medication Sig Dispense Refill    albuterol (PROVENTIL) 2.5 mg /3 mL (0.083 %) nebulizer solution Take 3 mLs (2.5 mg total) by nebulization every 6 (six) hours while awake. 360 mL 11    albuterol (PROVENTIL/VENTOLIN HFA) 90 mcg/actuation inhaler Inhale 1-2 puffs into the lungs every 4 (four) hours as needed. 1 Inhaler 11    aspirin (ECOTRIN) 81 MG EC tablet Take 81 mg by mouth Daily.      azithromycin (Z-AMALIA) 250 MG tablet Take 1 tablet (250 mg total) by mouth once daily. Take first 2 tablets together, then 1 every day until finished. 6 tablet 0    clonazePAM (KLONOPIN) 1 MG tablet Take 1 mg by mouth every evening.      diclofenac sodium (VOLTAREN) 1 % Gel Apply topically 4 (four) times daily as needed.      HYDROcodone-acetaminophen (NORCO)  mg per tablet Take 1 tablet by mouth every 8 (eight) hours as needed.      naproxen (NAPROSYN) 500 MG tablet Take 1 tablet by mouth 2 (two) times daily as needed.      temazepam (RESTORIL) 30 mg capsule Take 30 mg by mouth nightly as needed.      terbinafine HCl (LAMISIL) 250 mg tablet TK 1 T PO QD  0    tiotropium (SPIRIVA WITH HANDIHALER) 18 mcg inhalation capsule Inhale into the lungs.      verapamil (CALAN-SR) 120 MG CR tablet TK 1 T PO Q NIGHT  1    [DISCONTINUED] albuterol (PROVENTIL/VENTOLIN HFA) 90  "mcg/actuation inhaler Inhale 1-2 puffs into the lungs every 4 (four) hours as needed. 1 Inhaler 11    [DISCONTINUED] fluticasone-salmeterol (ADVAIR HFA) 115-21 mcg/actuation HFAA Inhale 2 puffs into the lungs 2 (two) times daily. Controller 1 Inhaler 11    azithromycin (ZITHROMAX Z-AMALIA) 250 MG tablet 500 mg on day 1 (two tablets) followed by 250 mg once daily on days 2-5 6 tablet 1    hydrocortisone 2.5 % cream APPLY AA BID PRN  3    ketoconazole (NIZORAL) 2 % cream APPLY TO RASH BID PRN  3    lisinopril-hydrochlorothiazide (PRINZIDE,ZESTORETIC) 10-12.5 mg per tablet Take 1 tablet by mouth Daily.      traZODone (DESYREL) 150 MG tablet Take 150 mg by mouth nightly as needed.      triamcinolone acetonide 0.1% (KENALOG) 0.1 % cream Apply to affected area 2 times daily      umeclidinium-vilanterol (ANORO ELLIPTA) 62.5-25 mcg/actuation DsDv Inhale 1 puff into the lungs once daily. Controller 60 each 11    venlafaxine (EFFEXOR-XR) 150 MG Cp24 Take 1 capsule by mouth Daily.       No facility-administered encounter medications on file as of 11/15/2019.      Orders Placed This Encounter   Procedures    HME - OTHER     Discontinue trilogy ventilator, continue oxygen    Referred to [facility] company:  [employer].  [address] [place] [id number]  [phone]  Fax [phone]     Order Specific Question:   Type of Equipment:     Answer:   home ventilator - discontinue     Order Specific Question:   Height:     Answer:   5' 7" (1.702 m)     Order Specific Question:   Weight:     Answer:   83.3 kg (183 lb 10.3 oz)     Order Specific Question:   Does patient have medical equipment at home?     Answer:   portable oxygen    X-Ray Chest PA And Lateral     Standing Status:   Future     Standing Expiration Date:   5/15/2021     Order Specific Question:   Reason for Exam:     Answer:   SOB     Plan:       Requested Prescriptions     Signed Prescriptions Disp Refills    umeclidinium-vilanterol (ANORO ELLIPTA) 62.5-25 " mcg/actuation DsDv 60 each 11     Sig: Inhale 1 puff into the lungs once daily. Controller    albuterol (PROVENTIL/VENTOLIN HFA) 90 mcg/actuation inhaler 1 Inhaler 11     Sig: Inhale 1-2 puffs into the lungs every 4 (four) hours as needed.     Chronic respiratory failure with hypoxia and hypercapnia  -     X-Ray Chest PA And Lateral; Future; Expected date: 11/15/2019  -     E - OTHER    Hypoxemia requiring supplemental oxygen    Sleep disorder    Chronic obstructive pulmonary disease, unspecified COPD type  -     umeclidinium-vilanterol (ANORO ELLIPTA) 62.5-25 mcg/actuation DsDv; Inhale 1 puff into the lungs once daily. Controller  Dispense: 60 each; Refill: 11  -     albuterol (PROVENTIL/VENTOLIN HFA) 90 mcg/actuation inhaler; Inhale 1-2 puffs into the lungs every 4 (four) hours as needed.  Dispense: 1 Inhaler; Refill: 11  -     X-Ray Chest PA And Lateral; Future; Expected date: 11/15/2019  -     E - OTHER           Follow up in about 6 months (around 5/15/2020) for Review CXR.    MEDICAL DECISION MAKING: Moderate to high complexity.  Overall, the multiple problems listed are of moderate to high severity that may impact quality of life and activities of daily living. Side effects of medications, treatment plan as well as options and alternatives reviewed and discussed with patient. There was counseling of patient concerning these issues.    Total time spent in face to face counseling and coordination of care - 25 minutes over 50% of time was used in discussion of prognosis, risks, benefits of treatment, instructions and compliance with regimen . Discussion with other physicians or health care providers (homehealth, durable medical equipment providers).

## 2020-05-11 ENCOUNTER — TELEPHONE (OUTPATIENT)
Dept: PULMONOLOGY | Facility: CLINIC | Age: 70
End: 2020-05-11

## 2020-05-21 DIAGNOSIS — J44.9 CHRONIC OBSTRUCTIVE PULMONARY DISEASE, UNSPECIFIED COPD TYPE: Primary | ICD-10-CM

## 2020-05-22 RX ORDER — TIOTROPIUM BROMIDE 18 UG/1
18 CAPSULE ORAL; RESPIRATORY (INHALATION) DAILY
Qty: 30 CAPSULE | Refills: 11 | Status: SHIPPED | OUTPATIENT
Start: 2020-05-22

## 2020-05-28 ENCOUNTER — TELEPHONE (OUTPATIENT)
Dept: PULMONOLOGY | Facility: CLINIC | Age: 70
End: 2020-05-28

## 2020-05-28 NOTE — TELEPHONE ENCOUNTER
----- Message from Giulia Carreon sent at 5/28/2020  1:52 PM CDT -----  Type: Patient Call Back    Who called: pt     What is the request in detail: pt asking for a call back to r/s XRAY and Office visit. There are no available appt with provider. Pt states he had the wrong date down for office visit an apologized for missing appt.     Can the clinic reply by [facility]?  No     Would the patient rather a call back or a response via [facility]? Call back     Best call back number: [phone]    Additional Information:

## 2020-06-01 ENCOUNTER — TELEPHONE (OUTPATIENT)
Dept: PULMONOLOGY | Facility: CLINIC | Age: 70
End: 2020-06-01

## 2020-06-01 NOTE — TELEPHONE ENCOUNTER
----- Message from Alondra Santillan sent at 6/1/2020  7:59 AM CDT -----  Contact: Manuela/wife  Manuela needs a call back at [phone], Regards to getting a sooner appointment patient will be going out of town in the middle of June and wants to be seen before than.    Thanks  Td

## 2020-06-01 NOTE — TELEPHONE ENCOUNTER
Informed pt that Incruse was prescribed instead of Spiriva.  Also pt wife cancelled upcoming appts due to being out of town.